# Patient Record
Sex: FEMALE | Race: WHITE | NOT HISPANIC OR LATINO | Employment: FULL TIME | URBAN - METROPOLITAN AREA
[De-identification: names, ages, dates, MRNs, and addresses within clinical notes are randomized per-mention and may not be internally consistent; named-entity substitution may affect disease eponyms.]

---

## 2019-06-17 ENCOUNTER — HISTORICAL (OUTPATIENT)
Dept: ADMINISTRATIVE | Facility: HOSPITAL | Age: 49
End: 2019-06-17

## 2019-06-17 LAB
ABS NEUT (OLG): 4.33 X10(3)/MCL (ref 2.1–9.2)
ALBUMIN SERPL-MCNC: 3.7 GM/DL (ref 3.4–5)
ALBUMIN/GLOB SERPL: 1.2 RATIO (ref 1.1–2)
ALP SERPL-CCNC: 50 UNIT/L (ref 38–126)
ALT SERPL-CCNC: 33 UNIT/L (ref 12–78)
AST SERPL-CCNC: 19 UNIT/L (ref 15–37)
BASOPHILS # BLD AUTO: 0 X10(3)/MCL (ref 0–0.2)
BASOPHILS NFR BLD AUTO: 0.4 %
BILIRUB SERPL-MCNC: 0.2 MG/DL (ref 0.2–1)
BILIRUBIN DIRECT+TOT PNL SERPL-MCNC: 0.1 MG/DL (ref 0–0.5)
BILIRUBIN DIRECT+TOT PNL SERPL-MCNC: 0.1 MG/DL (ref 0–0.8)
BUN SERPL-MCNC: 17 MG/DL (ref 7–18)
CALCIUM SERPL-MCNC: 8.9 MG/DL (ref 8.5–10.1)
CHLORIDE SERPL-SCNC: 106 MMOL/L (ref 98–107)
CO2 SERPL-SCNC: 32 MMOL/L (ref 21–32)
CREAT SERPL-MCNC: 0.75 MG/DL (ref 0.55–1.02)
DEPRECATED CALCIDIOL+CALCIFEROL SERPL-MC: 23.93 NG/ML (ref 30–80)
EOSINOPHIL # BLD AUTO: 0.2 X10(3)/MCL (ref 0–0.9)
EOSINOPHIL NFR BLD AUTO: 2.9 %
ERYTHROCYTE [DISTWIDTH] IN BLOOD BY AUTOMATED COUNT: 13.1 % (ref 11.5–17)
GLOBULIN SER-MCNC: 3.1 GM/DL (ref 2.4–3.5)
GLUCOSE SERPL-MCNC: 99 MG/DL (ref 74–106)
HCT VFR BLD AUTO: 40.2 % (ref 37–47)
HGB BLD-MCNC: 12.5 GM/DL (ref 12–16)
LYMPHOCYTES # BLD AUTO: 1.6 X10(3)/MCL (ref 0.6–4.6)
LYMPHOCYTES NFR BLD AUTO: 24.2 %
MCH RBC QN AUTO: 29.6 PG (ref 27–31)
MCHC RBC AUTO-ENTMCNC: 31.1 GM/DL (ref 33–36)
MCV RBC AUTO: 95 FL (ref 80–94)
MONOCYTES # BLD AUTO: 0.6 X10(3)/MCL (ref 0.1–1.3)
MONOCYTES NFR BLD AUTO: 8.8 %
NEUTROPHILS # BLD AUTO: 4.3 X10(3)/MCL (ref 2.1–9.2)
NEUTROPHILS NFR BLD AUTO: 63.6 %
PLATELET # BLD AUTO: 243 X10(3)/MCL (ref 130–400)
PMV BLD AUTO: 9.2 FL (ref 9.4–12.4)
POTASSIUM SERPL-SCNC: 3.7 MMOL/L (ref 3.5–5.1)
PROT SERPL-MCNC: 6.8 GM/DL (ref 6.4–8.2)
RBC # BLD AUTO: 4.23 X10(6)/MCL (ref 4.2–5.4)
SODIUM SERPL-SCNC: 143 MMOL/L (ref 136–145)
WBC # SPEC AUTO: 6.8 X10(3)/MCL (ref 4.5–11.5)

## 2019-06-24 PROBLEM — F43.23 ADJUSTMENT DISORDER WITH MIXED ANXIETY AND DEPRESSED MOOD: Status: ACTIVE | Noted: 2019-06-24

## 2019-06-24 PROBLEM — M19.90 OSTEOARTHRITIS: Status: ACTIVE | Noted: 2019-06-24

## 2019-06-24 PROBLEM — M19.90 OSTEOARTHRITIS: Chronic | Status: ACTIVE | Noted: 2019-06-24

## 2019-12-16 ENCOUNTER — HISTORICAL (OUTPATIENT)
Dept: ADMINISTRATIVE | Facility: HOSPITAL | Age: 49
End: 2019-12-16

## 2019-12-16 LAB
ABS NEUT (OLG): 5.61 X10(3)/MCL (ref 2.1–9.2)
ALBUMIN SERPL-MCNC: 4.1 GM/DL (ref 3.4–5)
ALBUMIN/GLOB SERPL: 1.2 RATIO (ref 1.1–2)
ALP SERPL-CCNC: 50 UNIT/L (ref 38–126)
ALT SERPL-CCNC: 42 UNIT/L (ref 12–78)
AST SERPL-CCNC: 23 UNIT/L (ref 15–37)
BASOPHILS # BLD AUTO: 0 X10(3)/MCL (ref 0–0.2)
BASOPHILS NFR BLD AUTO: 0.2 %
BILIRUB SERPL-MCNC: 0.3 MG/DL (ref 0.2–1)
BILIRUBIN DIRECT+TOT PNL SERPL-MCNC: 0.1 MG/DL (ref 0–0.5)
BILIRUBIN DIRECT+TOT PNL SERPL-MCNC: 0.2 MG/DL (ref 0–0.8)
BUN SERPL-MCNC: 11 MG/DL (ref 7–18)
CALCIUM SERPL-MCNC: 10 MG/DL (ref 8.5–10.1)
CHLORIDE SERPL-SCNC: 104 MMOL/L (ref 98–107)
CO2 SERPL-SCNC: 27 MMOL/L (ref 21–32)
CREAT SERPL-MCNC: 0.65 MG/DL (ref 0.55–1.02)
DEPRECATED CALCIDIOL+CALCIFEROL SERPL-MC: 55.27 NG/ML (ref 30–80)
EOSINOPHIL # BLD AUTO: 0.1 X10(3)/MCL (ref 0–0.9)
EOSINOPHIL NFR BLD AUTO: 1.3 %
ERYTHROCYTE [DISTWIDTH] IN BLOOD BY AUTOMATED COUNT: 13.1 % (ref 11.5–17)
GLOBULIN SER-MCNC: 3.3 GM/DL (ref 2.4–3.5)
GLUCOSE SERPL-MCNC: 95 MG/DL (ref 74–106)
HCT VFR BLD AUTO: 42.2 % (ref 37–47)
HGB BLD-MCNC: 13.6 GM/DL (ref 12–16)
LYMPHOCYTES # BLD AUTO: 1.9 X10(3)/MCL (ref 0.6–4.6)
LYMPHOCYTES NFR BLD AUTO: 23 %
MCH RBC QN AUTO: 29.8 PG (ref 27–31)
MCHC RBC AUTO-ENTMCNC: 32.2 GM/DL (ref 33–36)
MCV RBC AUTO: 92.5 FL (ref 80–94)
MONOCYTES # BLD AUTO: 0.5 X10(3)/MCL (ref 0.1–1.3)
MONOCYTES NFR BLD AUTO: 6.5 %
NEUTROPHILS # BLD AUTO: 5.6 X10(3)/MCL (ref 2.1–9.2)
NEUTROPHILS NFR BLD AUTO: 68.9 %
PLATELET # BLD AUTO: 252 X10(3)/MCL (ref 130–400)
PMV BLD AUTO: 9.1 FL (ref 9.4–12.4)
POTASSIUM SERPL-SCNC: 3.9 MMOL/L (ref 3.5–5.1)
PROT SERPL-MCNC: 7.4 GM/DL (ref 6.4–8.2)
RBC # BLD AUTO: 4.56 X10(6)/MCL (ref 4.2–5.4)
SODIUM SERPL-SCNC: 139 MMOL/L (ref 136–145)
WBC # SPEC AUTO: 8.2 X10(3)/MCL (ref 4.5–11.5)

## 2020-06-15 ENCOUNTER — HISTORICAL (OUTPATIENT)
Dept: ADMINISTRATIVE | Facility: HOSPITAL | Age: 50
End: 2020-06-15

## 2020-06-15 LAB
ABS NEUT (OLG): 4.01 X10(3)/MCL (ref 2.1–9.2)
ALBUMIN SERPL-MCNC: 4.2 GM/DL (ref 3.5–5)
ALBUMIN/GLOB SERPL: 1.3 RATIO (ref 1.1–2)
ALP SERPL-CCNC: 56 UNIT/L (ref 40–150)
ALT SERPL-CCNC: 23 UNIT/L (ref 0–55)
AST SERPL-CCNC: 20 UNIT/L (ref 5–34)
BASOPHILS # BLD AUTO: 0 X10(3)/MCL (ref 0–0.2)
BASOPHILS NFR BLD AUTO: 0.5 %
BILIRUB SERPL-MCNC: 0.3 MG/DL
BILIRUBIN DIRECT+TOT PNL SERPL-MCNC: 0.1 MG/DL (ref 0–0.5)
BILIRUBIN DIRECT+TOT PNL SERPL-MCNC: 0.2 MG/DL (ref 0–0.8)
BUN SERPL-MCNC: 11 MG/DL (ref 9.8–20.1)
CALCIUM SERPL-MCNC: 10.2 MG/DL (ref 8.4–10.2)
CHLORIDE SERPL-SCNC: 104 MMOL/L (ref 98–107)
CO2 SERPL-SCNC: 28 MMOL/L (ref 22–29)
CREAT SERPL-MCNC: 0.67 MG/DL (ref 0.55–1.02)
DEPRECATED CALCIDIOL+CALCIFEROL SERPL-MC: 54.1 NG/ML (ref 6.6–49.9)
EOSINOPHIL # BLD AUTO: 0.2 X10(3)/MCL (ref 0–0.9)
EOSINOPHIL NFR BLD AUTO: 3 %
ERYTHROCYTE [DISTWIDTH] IN BLOOD BY AUTOMATED COUNT: 13.1 % (ref 11.5–17)
GLOBULIN SER-MCNC: 3.3 GM/DL (ref 2.4–3.5)
GLUCOSE SERPL-MCNC: 99 MG/DL (ref 74–100)
HCT VFR BLD AUTO: 43.9 % (ref 37–47)
HGB BLD-MCNC: 13.8 GM/DL (ref 12–16)
LYMPHOCYTES # BLD AUTO: 1.8 X10(3)/MCL (ref 0.6–4.6)
LYMPHOCYTES NFR BLD AUTO: 28 %
MCH RBC QN AUTO: 29.6 PG (ref 27–31)
MCHC RBC AUTO-ENTMCNC: 31.4 GM/DL (ref 33–36)
MCV RBC AUTO: 94 FL (ref 80–94)
MONOCYTES # BLD AUTO: 0.5 X10(3)/MCL (ref 0.1–1.3)
MONOCYTES NFR BLD AUTO: 7.7 %
NEUTROPHILS # BLD AUTO: 4 X10(3)/MCL (ref 2.1–9.2)
NEUTROPHILS NFR BLD AUTO: 60.6 %
PLATELET # BLD AUTO: 247 X10(3)/MCL (ref 130–400)
PMV BLD AUTO: 8.7 FL (ref 9.4–12.4)
POTASSIUM SERPL-SCNC: 4.5 MMOL/L (ref 3.5–5.1)
PROT SERPL-MCNC: 7.5 GM/DL (ref 6.4–8.3)
RBC # BLD AUTO: 4.67 X10(6)/MCL (ref 4.2–5.4)
SODIUM SERPL-SCNC: 142 MMOL/L (ref 136–145)
WBC # SPEC AUTO: 6.6 X10(3)/MCL (ref 4.5–11.5)

## 2021-05-10 ENCOUNTER — HISTORICAL (OUTPATIENT)
Dept: ADMINISTRATIVE | Facility: HOSPITAL | Age: 51
End: 2021-05-10

## 2021-05-10 LAB
ABS NEUT (OLG): 5.62 X10(3)/MCL (ref 2.1–9.2)
ALBUMIN SERPL-MCNC: 4.7 GM/DL (ref 3.5–5)
ALBUMIN/GLOB SERPL: 1.3 RATIO (ref 1.1–2)
ALP SERPL-CCNC: 77 UNIT/L (ref 40–150)
ALT SERPL-CCNC: 38 UNIT/L (ref 0–55)
AST SERPL-CCNC: 26 UNIT/L (ref 5–34)
BASOPHILS # BLD AUTO: 0 X10(3)/MCL (ref 0–0.2)
BASOPHILS NFR BLD AUTO: 0.2 %
BILIRUB SERPL-MCNC: 0.6 MG/DL
BILIRUBIN DIRECT+TOT PNL SERPL-MCNC: 0.2 MG/DL (ref 0–0.5)
BILIRUBIN DIRECT+TOT PNL SERPL-MCNC: 0.4 MG/DL (ref 0–0.8)
BUN SERPL-MCNC: 12.5 MG/DL (ref 9.8–20.1)
CALCIUM SERPL-MCNC: 11.3 MG/DL (ref 8.4–10.2)
CHLORIDE SERPL-SCNC: 99 MMOL/L (ref 98–107)
CO2 SERPL-SCNC: 28 MMOL/L (ref 22–29)
CREAT SERPL-MCNC: 0.73 MG/DL (ref 0.55–1.02)
DEPRECATED CALCIDIOL+CALCIFEROL SERPL-MC: 38.9 NG/ML (ref 30–80)
EOSINOPHIL # BLD AUTO: 0.2 X10(3)/MCL (ref 0–0.9)
EOSINOPHIL NFR BLD AUTO: 2.2 %
ERYTHROCYTE [DISTWIDTH] IN BLOOD BY AUTOMATED COUNT: 14.8 % (ref 11.5–17)
GLOBULIN SER-MCNC: 3.7 GM/DL (ref 2.4–3.5)
GLUCOSE SERPL-MCNC: 86 MG/DL (ref 74–100)
HCT VFR BLD AUTO: 44.6 % (ref 37–47)
HGB BLD-MCNC: 14.4 GM/DL (ref 12–16)
LYMPHOCYTES # BLD AUTO: 2.1 X10(3)/MCL (ref 0.6–4.6)
LYMPHOCYTES NFR BLD AUTO: 24.3 %
MCH RBC QN AUTO: 29.7 PG (ref 27–31)
MCHC RBC AUTO-ENTMCNC: 32.3 GM/DL (ref 33–36)
MCV RBC AUTO: 92 FL (ref 80–94)
MONOCYTES # BLD AUTO: 0.7 X10(3)/MCL (ref 0.1–1.3)
MONOCYTES NFR BLD AUTO: 8.3 %
NEUTROPHILS # BLD AUTO: 5.6 X10(3)/MCL (ref 2.1–9.2)
NEUTROPHILS NFR BLD AUTO: 64.8 %
PLATELET # BLD AUTO: 239 X10(3)/MCL (ref 130–400)
PMV BLD AUTO: 8.9 FL (ref 9.4–12.4)
POTASSIUM SERPL-SCNC: 4 MMOL/L (ref 3.5–5.1)
PROT SERPL-MCNC: 8.4 GM/DL (ref 6.4–8.3)
RBC # BLD AUTO: 4.85 X10(6)/MCL (ref 4.2–5.4)
SODIUM SERPL-SCNC: 139 MMOL/L (ref 136–145)
WBC # SPEC AUTO: 8.7 X10(3)/MCL (ref 4.5–11.5)

## 2021-06-28 PROBLEM — N94.10 DYSPAREUNIA, FEMALE: Status: ACTIVE | Noted: 2021-06-28

## 2021-06-28 PROBLEM — Z85.3 HISTORY OF BREAST CANCER: Status: ACTIVE | Noted: 2021-06-28

## 2021-06-28 PROBLEM — N89.8 VAGINAL DRYNESS: Status: ACTIVE | Noted: 2021-06-28

## 2021-06-28 PROBLEM — Z01.419 WOMEN'S ANNUAL ROUTINE GYNECOLOGICAL EXAMINATION: Status: ACTIVE | Noted: 2021-06-28

## 2021-09-13 ENCOUNTER — HISTORICAL (OUTPATIENT)
Dept: ADMINISTRATIVE | Facility: HOSPITAL | Age: 51
End: 2021-09-13

## 2021-09-13 LAB
ABS NEUT (OLG): 5.16 X10(3)/MCL (ref 2.1–9.2)
ALBUMIN SERPL-MCNC: 4.2 GM/DL (ref 3.5–5)
ALBUMIN/GLOB SERPL: 1.5 RATIO (ref 1.1–2)
ALP SERPL-CCNC: 70 UNIT/L (ref 40–150)
ALT SERPL-CCNC: 25 UNIT/L (ref 0–55)
AST SERPL-CCNC: 17 UNIT/L (ref 5–34)
BASOPHILS # BLD AUTO: 0 X10(3)/MCL (ref 0–0.2)
BASOPHILS NFR BLD AUTO: 0.3 %
BILIRUB SERPL-MCNC: 0.3 MG/DL
BILIRUBIN DIRECT+TOT PNL SERPL-MCNC: 0.1 MG/DL (ref 0–0.5)
BILIRUBIN DIRECT+TOT PNL SERPL-MCNC: 0.2 MG/DL (ref 0–0.8)
BUN SERPL-MCNC: 9.4 MG/DL (ref 9.8–20.1)
CALCIUM SERPL-MCNC: 9.8 MG/DL (ref 8.4–10.2)
CHLORIDE SERPL-SCNC: 106 MMOL/L (ref 98–107)
CO2 SERPL-SCNC: 27 MMOL/L (ref 22–29)
CREAT SERPL-MCNC: 0.65 MG/DL (ref 0.55–1.02)
DEPRECATED CALCIDIOL+CALCIFEROL SERPL-MC: 39.5 NG/ML (ref 30–80)
EOSINOPHIL # BLD AUTO: 0.2 X10(3)/MCL (ref 0–0.9)
EOSINOPHIL NFR BLD AUTO: 3.4 %
ERYTHROCYTE [DISTWIDTH] IN BLOOD BY AUTOMATED COUNT: 14 % (ref 11.5–17)
GLOBULIN SER-MCNC: 2.8 GM/DL (ref 2.4–3.5)
GLUCOSE SERPL-MCNC: 99 MG/DL (ref 74–100)
HCT VFR BLD AUTO: 41.6 % (ref 37–47)
HGB BLD-MCNC: 13.4 GM/DL (ref 12–16)
LYMPHOCYTES # BLD AUTO: 1.4 X10(3)/MCL (ref 0.6–4.6)
LYMPHOCYTES NFR BLD AUTO: 18.8 %
MCH RBC QN AUTO: 30.1 PG (ref 27–31)
MCHC RBC AUTO-ENTMCNC: 32.2 GM/DL (ref 33–36)
MCV RBC AUTO: 93.5 FL (ref 80–94)
MONOCYTES # BLD AUTO: 0.6 X10(3)/MCL (ref 0.1–1.3)
MONOCYTES NFR BLD AUTO: 7.5 %
NEUTROPHILS # BLD AUTO: 5.2 X10(3)/MCL (ref 2.1–9.2)
NEUTROPHILS NFR BLD AUTO: 69.9 %
PLATELET # BLD AUTO: 228 X10(3)/MCL (ref 130–400)
PMV BLD AUTO: 9.3 FL (ref 9.4–12.4)
POTASSIUM SERPL-SCNC: 4 MMOL/L (ref 3.5–5.1)
PROT SERPL-MCNC: 7 GM/DL (ref 6.4–8.3)
RBC # BLD AUTO: 4.45 X10(6)/MCL (ref 4.2–5.4)
SODIUM SERPL-SCNC: 142 MMOL/L (ref 136–145)
VIT B12 SERPL-MCNC: 1037 PG/ML (ref 213–816)
WBC # SPEC AUTO: 7.4 X10(3)/MCL (ref 4.5–11.5)

## 2022-03-14 ENCOUNTER — HISTORICAL (OUTPATIENT)
Dept: ADMINISTRATIVE | Facility: HOSPITAL | Age: 52
End: 2022-03-14

## 2022-03-14 LAB
ABS NEUT (OLG): 2.76 (ref 2.1–9.2)
ALBUMIN SERPL-MCNC: 3.6 G/DL (ref 3.5–5)
ALBUMIN/GLOB SERPL: 1.3 {RATIO} (ref 1.1–2)
ALP SERPL-CCNC: 48 U/L (ref 40–150)
ALT SERPL-CCNC: 10 U/L (ref 0–55)
AST SERPL-CCNC: 14 U/L (ref 5–34)
BASOPHILS # BLD AUTO: 0 10*3/UL (ref 0–0.2)
BASOPHILS NFR BLD AUTO: 0.4 %
BILIRUB SERPL-MCNC: 0.1 MG/DL
BILIRUBIN DIRECT+TOT PNL SERPL-MCNC: <0.1 (ref 0–0.5)
BILIRUBIN DIRECT+TOT PNL SERPL-MCNC: >0 (ref 0–0.8)
BUN SERPL-MCNC: 14.7 MG/DL (ref 9.8–20.1)
CALCIUM SERPL-MCNC: 8.3 MG/DL (ref 8.7–10.5)
CHLORIDE SERPL-SCNC: 109 MMOL/L (ref 98–107)
CO2 SERPL-SCNC: 30 MMOL/L (ref 22–29)
CREAT SERPL-MCNC: 0.59 MG/DL (ref 0.55–1.02)
EOSINOPHIL # BLD AUTO: 0.1 10*3/UL (ref 0–0.9)
EOSINOPHIL NFR BLD AUTO: 2 %
ERYTHROCYTE [DISTWIDTH] IN BLOOD BY AUTOMATED COUNT: 13.8 % (ref 11.5–17)
GLOBULIN SER-MCNC: 2.7 G/DL (ref 2.4–3.5)
GLUCOSE SERPL-MCNC: 104 MG/DL (ref 74–100)
HCT VFR BLD AUTO: 39.2 % (ref 37–47)
HEMOLYSIS INTERF INDEX SERPL-ACNC: 6
HGB BLD-MCNC: 12.5 G/DL (ref 12–16)
ICTERIC INTERF INDEX SERPL-ACNC: 1
LIPEMIC INTERF INDEX SERPL-ACNC: 16
LYMPHOCYTES # BLD AUTO: 1.7 10*3/UL (ref 0.6–4.6)
LYMPHOCYTES NFR BLD AUTO: 34.6 %
MANUAL DIFF? (OHS): NO
MCH RBC QN AUTO: 29.9 PG (ref 27–31)
MCHC RBC AUTO-ENTMCNC: 31.9 G/DL (ref 33–36)
MCV RBC AUTO: 93.8 FL (ref 80–94)
MONOCYTES # BLD AUTO: 0.4 10*3/UL (ref 0.1–1.3)
MONOCYTES NFR BLD AUTO: 8 %
NEUTROPHILS # BLD AUTO: 2.8 10*3/UL (ref 2.1–9.2)
NEUTROPHILS NFR BLD AUTO: 54.8 %
PLATELET # BLD AUTO: 185 10*3/UL (ref 130–400)
PMV BLD AUTO: 9.1 FL (ref 9.4–12.4)
POTASSIUM SERPL-SCNC: 3.6 MMOL/L (ref 3.5–5.1)
PROT SERPL-MCNC: 6.3 G/DL (ref 6.4–8.3)
RBC # BLD AUTO: 4.18 10*6/UL (ref 4.2–5.4)
SODIUM SERPL-SCNC: 146 MMOL/L (ref 136–145)
WBC # SPEC AUTO: 5 10*3/UL (ref 4.5–11.5)

## 2022-05-03 NOTE — HISTORICAL [facility] CERNER
This is a historical note converted from Naren. Formatting and pictures may have been removed.  Please reference Naren for original formatting and attached multimedia. Chief Complaint  office visit-follow up  History of Present Illness  office visit-breast cancer  Breast cancer follow up  ?   Problem list:?Malignant neoplasm of central portion of right female breast, T2N0(i+)? M0, diagnosed on 12/9/2015.  ?????????????????? Surgical pathology: revealed a 2.5 cm?right breast invasive lobular carcinoma, surgical marking margins negative?all 3?lymph nodes negative for metastatic disease?however positive by???????????  ???????????????????immunohistochemical staining,  ?????????????????? ER 96% NC 81%, HER-2 new?negative not amplified by FISH, Ki-67 4%  ?????????????????? Oncotype score of 10  ?  ?   Current treatment:?Tamoxifen, started?March 2016.  ?   Treatment history:?  Bilateral simple mastectomies followed by Adjuvant Taxotere/Cytoxan?every 3 weeks x4, completed March 25, 2016. with?reconstruction.  Genetic screening?BRCA negative  ?????????????????????????  History of Present Illness  Patients ?history?starts?in 2015 where she had an abnormal mammogram?and a right breast mass.? Subsequently, biopsy confirmed?lobular carcinoma. ?She underwent bilateral simple mastectomies with a sentinel lymph node biopsy.? Genetic testing?and myriad risk was negative. She had an Oncotype diagnosis score of 10. Due to her strong family history and young age, she was given adjuvant chemotherapy with Taxotere and Cytoxan. She completed 4 cycles q 3 weeks in March 2016. She had reconstruction and was started on tamoxifen.  In 2017, she moved from [place] and obtained local oncologist in [place]. Dr. Cheng has been following her since then.  ?   12/10/18: Patient presents for routine surveillance today for breast cancer. She is taking tamoxifen.? Since her last visit. ?Patient was started on?Effexor for hot flashes. ?It is  made a significant difference.? She still complains of fatigue some weight gain night sweats and hot flashes. ?Chronic postnasal drip and hayfever. ?Chronic heartburn indigestion.? She still got her pain in her feet greater than 10 years. ?The rest of her review of systems is unremarkable [1]  ?  ?  Interim history:?Patient was last seen by us in December 2018.  She is had one fall.? She does not check her sugar much.? She denies any new masses or lesions?besides osteoarthritic type pain in the rest of her review of systems is fairly unremarkable.  Review of Systems  Constitutional: ?[No fever, fatigue or weight loss. ?No chills, no night sweats. ?No weakness] ?  Eyes:? [No recent vision changes,]?  ENT:? [+ congestion,? noear pain, or sore throat. no ringing in ears. No Dry mouth. no epistaxis .? No dental problems.? No epistaxis]?  Endocrine:? [No thyroid problems. ]?  Cardiovascular:? [No chest pain, Palpitations,? PND, orthopnea.? Edema]?  Respiratory:? [No cough, shortness of breath, congestion, or wheezing.? No hemoptysis]?  Gastrointestinal:? [No abdominal pain, nausea, vomiting, or diarrhea. No jaundice  NO Black tarry stool].? No heartburn.? No anorexia.? -mild reflux  Genitourinary: [No dysuria.? No frequency.]?  Musculoskeletal:? [No joint swelling.? No joint pain.]?  Neurologic:? [No seizures.? No numbness, or tingling. No Headaches. No loss of balance. no new weakness.? No headaches.? No tremors]  Skin:? [No rash.? No new skin lesions. no excess dryness]?  Hematologic:? [No unusual bruising or bleeding.]?  Psychiatric:? [No psychiatric problems, hallucinations or depression.]?  [All other systems reviewed and otherwise negative.]  ?  ?  ?  Physical Exam  Vitals & Measurements  T:?37.0? ?C (Oral)? HR:?83(Peripheral)? BP:?139/95?  HT:?167.6?cm? WT:?91.1?kg? BMI:?32.43?  VITAL SIGNS: ?Reviewed. ?[ ?]  GENERAL: [Well-developed well-nourished in no apparent cardiorespiratory distress]?  HEAD: Normocephalic,  atraumatic  EYES:? Pupils are equal.?? reactive to light.? Extraocular motions intact.? No scleral icterus.? No pallor.  EARS:? Hearing grossly intact.  MOUTH:? Oropharynx is clear without exudates or erythema.  NECK: Supple no adenopathy, no JVD.? Trachea midline  CHEST:? Chest with clear breath sounds bilaterally.? No wheezes, rales, or rhonchi.?  CARDIAC:? Regular rate and rhythm.? S1 and S2, without murmurs, gallops, or rubs.  VASCULAR:? No Edema.? Peripheral pulses normal and equal in all extremities.  ABDOMEN:? Soft, without detectable tenderness.? No sign of distention.? No?? rebound or guarding, and no masses palpated.?? Bowel Sounds normal.  MUSCULOSKELETAL:? Good range of motion of all major joints. Extremities without clubbing, cyanosis or edema.?  NEUROLOGIC EXAM:? Alert and oriented x 3.? No focal sensory or strength deficits.?? Speech normal.? Follows commands.  PSYCHIATRIC:? Mood normal.  SKIN:? No rash or lesions.  Breast: no masses , no nodules, no nipple discharge  Lymph nodes: no? supraclavicular, infraclavicular, axillary, or inguinal adenopathy  ECOG-0  Assessment/Plan  1.?Cancer of central portion of right female breast?C50.111  ?This is a 48-year-old female?who was diagnosed with Carcinoma of the right breast.? In December 2015. ?She had bilateral simple mastectomies followed by adjuvant chemotherapy of Taxotere Cytoxan every 3 weeks x 4 cycles?which she completed in March 2016. ?She then had bilateral reconstruction?she is currently on tamoxifen which she started in March 2016.  Ordered:  CBC with diff CLINIC, Routine collect, *Est. 12/17/19 3:00:00 CST, Blood, Order for future visit, *Est. Stop date 12/17/19 3:00:00 CST, Lab Collect, Cancer of central portion of right female breast, 06/17/19 14:33:00 CDT  Clinic Follow up, *Est. 12/17/19 3:00:00 CST, Order for future visit, Cancer of central portion of right female breast, [facility]  Comprehensive Metabolic Panel, Routine collect,  *Est. 12/17/19 3:00:00 CST, Blood, Order for future visit, *Est. Stop date 12/17/19 3:00:00 CST, Lab Collect, Cancer of central portion of right female breast, 06/17/19 14:33:00 CDT  Lab Draw, *Est. 12/17/19 3:00:00 CST, Order for future visit, Cancer of central portion of right female breast, [facility]  MG Dexa Bone Density Study, Routine, *Est. 07/29/19 4:00:00 CDT, Menopausal and Post Menopausal Disorder, None, Ambulatory, Rad Type, Order for future visit, Cancer of central portion of right female breast, Schedule this test, [facility], *Est. 07/29/19 4:00:00 CDT  Vitamin D, 25-Hydroxy Level, Routine collect, *Est. 12/17/19 3:00:00 CST, Blood, Order for future visit, *Est. Stop date 12/17/19 3:00:00 CST, Lab Collect, Cancer of central portion of right female breast, 06/17/19 14:33:00 CDT  ?  2.?Post-menopause?Z78.0  ?  3.?Vitamin D deficiency?E55.9  ?  Orders:  cholecalciferol, 50,000 IntUnit, Oral, qMonth, # 3 cap(s), 1 Refill(s), Pharmacy: [id number]  tamoxifen, 20 mg = 1 tab(s), Oral, Daily, # 90 tab(s), 1 Refill(s), Pharmacy: [id number]  Plan:  The overall plan was to continue tamoxifen until March 2021 and then switch to an aromatase inhibitor.? For 5 years.  We discussed the use of aromatase inhibitors and zoladex  or change to AI now  We will get a baseline bone density now that she is been menopausal for over 2 years.  Return to clinic in 6 months with? cbc/CMP, vitamin D panel-and based on her bone density results . will discuss change to AI on next visit  Bone density should be done every 2 years  Referrals  Clinic Follow up, *Est. 12/17/19 3:00:00 CST, Order for future visit, Cancer of central portion of right female breast, [facility]  Lab Draw, *Est. 12/17/19 3:00:00 CST, Order for future visit, Cancer of central portion of right female breast, [facility]   Problem List/Past Medical History  Ongoing  Chronic back pain  Hay fever  High  cholesterol  HTN - Hypertension  Obesity  Osteoarthritis  Diabetes  Postmenopausal  CPAP  Procedure/Surgical History  Bilateral reconstruction of breasts (07.2016)  Bilateral mastectomy (12.2015)  Colonoscopy (2011)  Esophagoduodenostomy (2011)  Partial hysterectomy (2011)  Extraction of wisdom tooth   Medications  cloNIDine, 0.2 mg, Oral, BID  Effexor XR 37.5 mg oral capsule, extended release, 37.5 mg= 1 cap(s), Oral, Daily  Flonase 50 mcg/inh nasal spray, 2 spray(s), Nasal, BID, PRN  metformin 500 mg oral tablet, 500 mg= 1 tab(s), Oral, BID  tamoxifen 20 mg oral tablet, 20 mg= 1 tab(s), Oral, Daily, 1 refills  traMADol 50 mg oral tablet, 50 mg= 1 tab(s), Oral, q4hr, PRN  Vitamin D3 50,000 intl units oral capsule, 71127 IntUnit, Oral, qMonth, 1 refills  Xyzal 5 mg oral tablet, 5 mg= 1 tab(s), Oral, qPM, PRN  Zanaflex 6 mg oral capsule, 6 mg= 1 cap(s), Oral, TID, PRN  Atrovent nasal spray  Allergies  No Known Allergies  Social History  Abuse/Neglect  No, 06/17/2019  Alcohol  Current, Beer, Wine, Liquor, 1-2 times per week, 12/10/2018  Substance Use  Never, 12/10/2018  Tobacco  Never (less than 100 in lifetime), N/A, 06/17/2019  Never smoker, N/A, 12/10/2018  Family History  Atrial fibrillation: Mother.  Breast cancer: Paternal Grandmother.  Congestive heart disease.: Mother and Father.  Diabetes mellitus: Mother and Brother.  Hyperlipidemia.: Mother.  Hypertension.: Mother and Sister.  Liver cancer: Uncle.  Primary malignant neoplasm of colon: Aunt.  Primary malignant neoplasm of prostate: Uncle.  Health Maintenance  Health Maintenance  ???Pending?(in the next year)  ??? ??OverDue  ??? ? ? ?Alcohol Misuse Screening due??01/01/19??and every 1??year(s)  ??? ??Due?  ??? ? ? ?ADL Screening due??06/17/19??and every 1??year(s)  ??? ? ? ?Diabetes Screening due??06/17/19??and every?  ??? ? ? ?Hypertension Management-BMP due??06/17/19??and every?  ??? ? ? ?Hypertension Management-Education due??06/17/19??and every  1??year(s)  ??? ? ? ?Tetanus Vaccine due??06/17/19??and every 10??year(s)  ??? ??Due In Future?  ??? ? ? ?Obesity Screening not due until??01/01/20??and every 1??year(s)  ??? ? ? ?Blood Pressure Screening not due until??06/16/20??and every 1??year(s)  ??? ? ? ?Body Mass Index Check not due until??06/16/20??and every 1??year(s)  ??? ? ? ?Depression Screening not due until??06/16/20??and every 1??year(s)  ??? ? ? ?Hypertension Management-Blood Pressure not due until??06/16/20??and every 1??year(s)  ???Satisfied?(in the past 1 year)  ??? ??Satisfied?  ??? ? ? ?Blood Pressure Screening on??06/17/19.??Satisfied by Viviana Haines LPN  ??? ? ? ?Body Mass Index Check on??06/17/19.??Satisfied by Viviana Haines LPN  ??? ? ? ?Depression Screening on??06/17/19.??Satisfied by Viviana Haines LPN  ??? ? ? ?Hypertension Management-Blood Pressure on??06/17/19.??Satisfied by Viviana Haines LPN  ??? ? ? ?Obesity Screening on??06/17/19.??Satisfied by Viviana Haines LPN  ?  ?  Lab Results  Test Name Test Result Date/Time   WBC 6.8 x10(3)/mcL 06/17/2019 13:33 CDT   RBC 4.23 x10(6)/mcL 06/17/2019 13:33 CDT   Hgb 12.5 gm/dL 06/17/2019 13:33 CDT   Hct 40.2 % 06/17/2019 13:33 CDT   Platelet 243 x10(3)/mcL 06/17/2019 13:33 CDT   MCV 95.0 fL (High) 06/17/2019 13:33 CDT   MCH 29.6 pg 06/17/2019 13:33 CDT   MCHC 31.1 gm/dL (Low) 06/17/2019 13:33 CDT   RDW 13.1 % 06/17/2019 13:33 CDT   MPV 9.2 fL (Low) 06/17/2019 13:33 CDT   Abs Neut 4.33 x10(3)/mcL 06/17/2019 13:33 CDT   NEUT% 63.6 % 06/17/2019 13:33 CDT   NEUT# 4.3 x10(3)/mcL 06/17/2019 13:33 CDT   LYMPH% 24.2 % 06/17/2019 13:33 CDT   LYMPH# 1.6 x10(3)/mcL 06/17/2019 13:33 CDT   MONO% 8.8 % 06/17/2019 13:33 CDT   MONO# 0.6 x10(3)/mcL 06/17/2019 13:33 CDT   EOS% 2.9 % 06/17/2019 13:33 CDT   EOS# 0.2 x10(3)/mcL 06/17/2019 13:33 CDT   BASO% 0.4 % 06/17/2019 13:33 CDT   BASO# 0.0 x10(3)/mcL 06/17/2019 13:33 CDT      [1]?Office Visit Note; Mann AGUERO, Myra Varner 12/07/2018 11:14 CST    His vitamin D still came back low at 23.9.? She is been compliant with her prescription.? However we will put her back on weekly dosing for 4 weeks and then back to once a month dosing.? She called she understood the above prescription sent

## 2022-05-03 NOTE — HISTORICAL [facility] CERNER
This is a historical note converted from Naren. Formatting and pictures may have been removed.  Please reference Naren for original formatting and attached multimedia. Chief Complaint  No concerns.  History of Present Illness  office visit-breast cancer  Breast cancer follow up  ?   Problem list:?Malignant neoplasm of central portion of right female breast, T2N0(i+)? M0, diagnosed on 12/9/2015.  ?????????????????? Surgical pathology: revealed a 2.5 cm?right breast invasive lobular carcinoma, surgical marking margins negative?all 3?lymph nodes negative for metastatic disease?however positive by???????????  ???????????????????immunohistochemical staining,  ?????????????????? ER 96% OR 81%, HER-2 new?negative not amplified by FISH, Ki-67 4%  ?????????????????? Oncotype score of 10  ?  ?   Current treatment:?Tamoxifen, started?March 2016.  ?   Treatment history:?  Bilateral simple mastectomies followed by Adjuvant Taxotere/Cytoxan?every 3 weeks x4, completed March 25, 2016. with?reconstruction.  Genetic screening?BRCA negative  ?????????????????????????  History of Present Illness  Patients ?history?starts?in 2015 where she had an abnormal mammogram?and a right breast mass.? Subsequently, biopsy confirmed?lobular carcinoma. ?She underwent bilateral simple mastectomies with a sentinel lymph node biopsy.? Genetic testing?and myriad risk was negative. She had an Oncotype diagnosis score of 10. Due to her strong family history and young age, she was given adjuvant chemotherapy with Taxotere and Cytoxan. She completed 4 cycles q 3 weeks in March 2016. She had reconstruction and was started on tamoxifen.  In 2017, she moved from [place]?to ?[place]. Dr. Cheng has been following her since then.  ?   12/10/18: Patient presents for routine surveillance today for breast cancer. She is taking tamoxifen.? Since her last visit. ?Patient was started on?Effexor for hot flashes. ?It is made a significant difference.? She  still complains of fatigue some weight gain night sweats and hot flashes. ?Chronic postnasal drip and hayfever. ?Chronic heartburn indigestion.? She still got her pain in her feet greater than 10 years. ?The rest of her review of systems is unremarkable  ?  ?   Interim history? :?Patient last seen December 2019.? The plan was for her to continue tamoxifen until March 2021 and then switch to an aromatase inhibitor.? She is to return to clinic?with labs?vitamin D.? And continue bone density every 2 years.  New onset bigemity--  Patient reports she was at work,?she noted some palpitations. ?New onset of aura.? She had bigeminy and some PACs.? Picked up. ?She has a scheduled follow-up with her cardiologist.? She is not noticed any since.  Review of Systems  12 point review of systems performed, pertinent positives above in interim history?otherwise negative  Physical Exam  Vitals & Measurements  T:?37? ?C (Oral)? HR:?86(Peripheral)? BP:?138/96?  HT:?167.6?cm? WT:?87.5?kg? BMI:?31.15?  VITAL SIGNS: ?Reviewed. ?[ ?]  GENERAL: [Well-developed well-nourished in no apparent cardiorespiratory distress]?  HEAD: Normocephalic, atraumatic  EYES:? Pupils are equal.?? reactive to light.? Extraocular motions intact.? No scleral icterus.? No pallor.  EARS:? Hearing grossly intact.  MOUTH:? Oropharynx is clear without exudates or erythema.  NECK: Supple no adenopathy, no JVD.? Trachea midline  CHEST:? Chest with clear breath sounds bilaterally.? No wheezes, rales, or rhonchi.?  CARDIAC:? Regular rate and rhythm.? S1 and S2, without murmurs, gallops, or rubs.  VASCULAR:? No Edema.? Peripheral pulses normal and equal in all extremities.  ABDOMEN:? Soft, without detectable tenderness.? No sign of distention.? No?? rebound or guarding, and no masses palpated.?? Bowel Sounds normal.  MUSCULOSKELETAL:? Good range of motion of all major joints. Extremities without clubbing, cyanosis or edema.?  NEUROLOGIC EXAM:? Alert and oriented x 3.? No  focal sensory or strength deficits.?? Speech normal.? Follows commands.  PSYCHIATRIC:? Mood normal.  SKIN:? No rash or lesions.  Breast: no masses , no nodules, no nipple discharge  Lymph nodes: no? supraclavicular, infraclavicular, axillary, or inguinal adenopathy  ECOG-0?  Assessment/Plan  1.?Post-menopause?Z78.0  2.?Cancer of central portion of right female breast?C50.111  3.?Vitamin D deficiency?E55.9  4. ?irRegular heart rhythm  The overall plan was to continue tamoxifen until March 2021 and then switch to an aromatase inhibitor.? For 5 years.  We discussed the use of aromatase inhibitors and Zoladex  or change to AI now  Return to clinic in 6 months with? cbc/CMP, vitamin D panel-and based on her bone density results . will discuss change to AI on next visit  Bone density should be done every 2 years  ?  ?  ?   will wait till next visit for change --until after cardiology evaluation  ?   Nir Cheng MD   Problem List/Past Medical History  Ongoing  Chronic back pain  Hay fever  High cholesterol  HTN - Hypertension  Obesity  Osteoarthritis  Post-menopause  Vitamin D deficiency  Procedure/Surgical History  Bilateral reconstruction of breasts (07.2016)  Bilateral mastectomy (12.2015)  Colonoscopy (2011)  Esophagoduodenostomy (2011)  Partial hysterectomy (2011)  Extraction of wisdom tooth   Medications  Atrovent 42 mcg/inh nasal spray, 2 spray(s)  cloNIDine, 0.2 mg, Oral, BID  Flonase 50 mcg/inh nasal spray, 2 spray(s), Nasal, BID, PRN  metformin 500 mg oral tablet, 500 mg= 1 tab(s), Oral, BID  metroNIDAZOLE 0.75% topical gel, 1 earl, TOP, BID  minocycline 100 mg oral capsule, 100 mg= 1 cap(s), Oral, q12hr  promethazine 25 mg oral tablet, 25 mg= 1 tab(s), Oral, q6hr  tamoxifen 20 mg oral tablet, 20 mg= 1 tab(s), Oral, Daily, 1 refills  temazepam 7.5 mg oral capsule, 7.5 mg= 1 cap(s), Oral, Once a day (at bedtime)  tiZANidine 4 mg oral tablet, 4 mg= 1 tab(s), Oral, BID  traMADol 50 mg oral tablet, 50 mg= 1  tab(s), Oral, q4hr, PRN  tretinoin 0.025% topical cream, 1 earl, TOP, qPM  valacyclovir 500 mg oral tablet, 500 mg= 1 tab(s), Oral, Daily  Vitamin D3 50,000 intl units oral capsule, See Instructions, 1 refills  Xyzal 5 mg oral tablet, 5 mg= 1 tab(s), Oral, qPM, PRN  Allergies  No Known Allergies  Social History  Abuse/Neglect  No, 06/15/2020  Alcohol  Current, Beer, Wine, Liquor, 1-2 times per week, 12/10/2018  Substance Use  Never, 12/10/2018  Tobacco  Never (less than 100 in lifetime), No, 06/15/2020  Never (less than 100 in lifetime), N/A, 06/17/2019  Never smoker, N/A, 12/10/2018  Family History  Atrial fibrillation: Mother.  Breast cancer: Paternal Grandmother.  Congestive heart disease.: Mother and Father.  Diabetes mellitus: Mother and Brother.  Hyperlipidemia.: Mother.  Hypertension.: Mother and Sister.  Liver cancer: Uncle.  Primary malignant neoplasm of colon: Aunt.  Primary malignant neoplasm of prostate: Uncle.  Health Maintenance  Health Maintenance  ???Pending?(in the next year)  ??? ??OverDue  ??? ? ? ?Alcohol Misuse Screening due??01/01/20??and every 1??year(s)  ??? ??Due?  ??? ? ? ?ADL Screening due??06/15/20??and every 1??year(s)  ??? ? ? ?Hypertension Management-Education due??06/15/20??and every 1??year(s)  ??? ? ? ?Tetanus Vaccine due??06/15/20??and every 10??year(s)  ??? ??Due In Future?  ??? ? ? ?Hypertension Management-BMP not due until??12/15/20??and every 1??year(s)  ??? ? ? ?Obesity Screening not due until??01/01/21??and every 1??year(s)  ???Satisfied?(in the past 1 year)  ??? ??Satisfied?  ??? ? ? ?Blood Pressure Screening on??06/15/20.??Satisfied by Ayala Cadena  ??? ? ? ?Body Mass Index Check on??06/15/20.??Satisfied by Ayala Cadena  ??? ? ? ?Depression Screening on??06/15/20.??Satisfied by Ayala Cadena  ??? ? ? ?Diabetes Screening on??12/16/19.??Satisfied by [place], Stephanie A.  ??? ? ? ?Hypertension Management-Blood Pressure on??06/15/20.??Satisfied by Ayala Cadena  ??? ? ?  ?Obesity Screening on??06/15/20.??Satisfied by Ayala Cadena  ?  Lab Results  Test Name Test Result Date/Time   WBC 6.6 x10(3)/[facility] 06/15/2020 12:39 CDT   RBC 4.67 x10(6)/[facility] 06/15/2020 12:39 CDT   Hgb 13.8 gm/dL 06/15/2020 12:39 CDT   Hct 43.9 % 06/15/2020 12:39 CDT   Platelet 247 x10(3)/[facility] 06/15/2020 12:39 CDT   MCV 94.0 fL 06/15/2020 12:39 CDT   MCH 29.6 pg 06/15/2020 12:39 CDT   MCHC 31.4 gm/dL (Low) 06/15/2020 12:39 CDT   RDW 13.1 % 06/15/2020 12:39 CDT   MPV 8.7 fL (Low) 06/15/2020 12:39 CDT   Abs Neut 4.01 x10(3)/[facility] 06/15/2020 12:39 CDT   NEUT% 60.6 % 06/15/2020 12:39 CDT   NEUT# 4.0 x10(3)/[facility] 06/15/2020 12:39 CDT   LYMPH% 28.0 % 06/15/2020 12:39 CDT   LYMPH# 1.8 x10(3)/[facility] 06/15/2020 12:39 CDT   MONO% 7.7 % 06/15/2020 12:39 CDT   MONO# 0.5 x10(3)/[facility] 06/15/2020 12:39 CDT   EOS% 3.0 % 06/15/2020 12:39 CDT   EOS# 0.2 x10(3)/[facility] 06/15/2020 12:39 CDT   BASO% 0.5 % 06/15/2020 12:39 CDT   BASO# 0.0 x10(3)/[facility] 06/15/2020 12:39 CDT

## 2022-05-03 NOTE — HISTORICAL [facility] CERNER
This is a historical note converted from Naren. Formatting and pictures may have been removed.  Please reference Naren for original formatting and attached multimedia. Chief Complaint  questions about medication  History of Present Illness  office visit-breast cancer  Breast cancer follow up  ?   Problem list:?Malignant neoplasm of central portion of right female breast, T2N0(i+)? M0, diagnosed on 12/9/2015.  ?????????????????? Surgical pathology: revealed a 2.5 cm?right breast invasive lobular carcinoma, surgical marking margins negative?all 3?lymph nodes negative for metastatic disease?however positive by???????????  ???????????????????immunohistochemical staining,  ?????????????????? ER 96% SD 81%, HER-2 new?negative not amplified by FISH, Ki-67 4%  ?????????????????? Oncotype score of 10  ?  ?   Current treatment:?Femara March 2021  ??????????????????????????????????????? Aromasin  Treatment history:?  Bilateral simple mastectomies  Adjuvant Taxotere/Cytoxan?every 3 weeks x4, completed March 25, 2016.  reconstruction.  Genetic screening?BRCA negative  ?   Tamoxifen?March 2016,?- March 2021  ?  ?   History of Present Illness  Patients ?history?starts?in 2015 where she had an abnormal mammogram?and a right breast mass.? Subsequently, biopsy confirmed?lobular carcinoma. ?She underwent bilateral simple mastectomies with a sentinel lymph node biopsy.? Genetic testing?and myriad risk was negative. She had an Oncotype diagnosis score of 10. Due to her strong family history and young age, she was given adjuvant chemotherapy with Taxotere and Cytoxan. She completed 4 cycles q 3 weeks in March 2016. She had reconstruction and was started on tamoxifen.  In 2017, she moved from [place]?to ?[place]. Dr. Cheng has been following her since then.  ?   Interim history:  9/13/2021--called about vaginal estrogen with atrophic vagitis. ?The patients biggest problem has been her joint pain and tendinitis after coming off  the tamoxifen to the aromatase inhibitors.? She did not tolerate the Femara well. ?The Aromasin is better?but?she still has atrophic vaginitis and difficulty with her?tendinitis and?both joint pain. ?Where she needs to take other medications.? While she is off of it she is better.  Review of Systems  Positives above in interim history, otherwise 12 point review systems negative  Physical Exam  Vitals & Measurements  T:?36.7? ?C (Oral)? HR:?77(Peripheral)? BP:?157/92?  HT:?167.60?cm? WT:?85.900?kg? BMI:?30.58?  VITAL SIGNS: ?Reviewed. ?[ ?]  GENERAL: [Well-developed well-nourished in no apparent cardiorespiratory distress]?  HEAD: Normocephalic, atraumatic  EYES:? Pupils are equal.?? reactive to light.? Extraocular motions intact.? No scleral icterus.? No pallor.  EARS:? Hearing grossly intact.  MOUTH:? Oropharynx is clear without exudates or erythema.  NECK: Supple no adenopathy, no JVD.? Trachea midline  CHEST:? Chest with clear breath sounds bilaterally.? No wheezes, rales, or rhonchi.?  CARDIAC:? Regular rate and rhythm.? S1 and S2, without murmurs, gallops, or rubs.  VASCULAR:? No Edema.? Peripheral pulses normal and equal in all extremities.  ABDOMEN:? Soft, without detectable tenderness.? No sign of distention.? No?? rebound or guarding, and no masses palpated.?? Bowel Sounds normal.  MUSCULOSKELETAL:? Good range of motion of all major joints. Extremities without clubbing, cyanosis or edema.?  NEUROLOGIC EXAM:? Alert and oriented x 3.? No focal sensory or strength deficits.?? Speech normal.? Follows commands.  PSYCHIATRIC:? Mood normal.  SKIN:? No rash or lesions.  Breast: mastectomy reconsstruction  Lymph nodes: no? supraclavicular, infraclavicular, axillary, or inguinal adenopathy  ECOG-0? [1] [1]  Assessment/Plan  1.?Vitamin D deficiency?E55.9  Ordered:  Comprehensive Metabolic Panel, ROUTINE collect, 09/13/21 14:39:32 CDT, BLOOD, Collected, Stop date 09/13/21 14:39:00 CDT, Lab Collect  Vitamin B12 Level,  ROUTINE collect, 09/13/21 14:39:32 CDT, BLOOD, Collected, Stop date 09/13/21 14:39:00 CDT, Lab Collect  Vitamin D, 25-Hydroxy Level, ROUTINE collect, 09/13/21 14:39:32 CDT, BLOOD, Collected, Stop date 09/13/21 14:34:00 CDT, Lab Collect  ?  2.?Aromatase inhibitor use?Z79.811  Ordered:  Comprehensive Metabolic Panel, ROUTINE collect, 09/13/21 14:39:32 CDT, BLOOD, Collected, Stop date 09/13/21 14:39:00 CDT, Lab Collect  Vitamin B12 Level, ROUTINE collect, 09/13/21 14:39:32 CDT, BLOOD, Collected, Stop date 09/13/21 14:39:00 CDT, Lab Collect  Vitamin D, 25-Hydroxy Level, ROUTINE collect, 09/13/21 14:39:32 CDT, BLOOD, Collected, Stop date 09/13/21 14:34:00 CDT, Lab Collect  ?  3.?Cancer of central portion of right female breast?C50.111  Ordered:  anastrozole, 1 mg = 1 tab(s), Oral, Daily, # 30 tab(s), 3 Refill(s), Pharmacy: [id number], 167.6, cm, Height/Length Dosing, 09/13/21 14:55:00 CDT, 85.9, kg, Weight Dosing, 09/13/21 14:55:00 CDT  CBC with diff CLINIC, Routine collect, *Est. 03/13/22 3:00:00 CDT, Blood, Order for future visit, *Est. Stop date 03/13/22 3:00:00 CDT, Lab Collect, Cancer of central portion of right female breast, 09/13/21 15:35:00 CDT  Clinic Follow up, *Est. 03/13/22 3:00:00 CDT, Order for future visit, Cancer of central portion of right female breast, [facility]  Comprehensive Metabolic Panel, ROUTINE collect, 09/13/21 14:39:32 CDT, BLOOD, Collected, Stop date 09/13/21 14:39:00 CDT, Lab Collect  Comprehensive Metabolic Panel, Routine collect, *Est. 03/13/22 3:00:00 CDT, Blood, Order for future visit, *Est. Stop date 03/13/22 3:00:00 CDT, Lab Collect, Cancer of central portion of right female breast, 09/13/21 15:35:00 CDT  Lab Draw, *Est. 03/13/22 3:00:00 CDT, Order for future visit, Cancer of central portion of right female breast, [facility]  Vitamin B12 Level, ROUTINE collect, 09/13/21 14:39:32 CDT, BLOOD, Collected, Stop date 09/13/21 14:39:00 CDT, Lab Collect  Vitamin D,  25-Hydroxy Level, ROUTINE collect, 09/13/21 14:39:32 CDT, BLOOD, Collected, Stop date 09/13/21 14:34:00 CDT, Lab Collect  ?  Return to clinic in 6 months with CBC, CMP,  Options of therapy discussed,: Changed to Arimidex,?changed back to tamoxifen  This a 50-year-old postmenopausal female now with an Oncotype of 10. ?She was treated years ago with Taxotere Cytoxan?and then tamoxifen x5 years. ?She was recently changed aromatase inhibitor in March 2021.  She is been intolerant to the above. ?We discussed?extended therapy?given the RAMSEY 17 data and other data. ?We discussed the side effects of aromatase inhibitors versus tamoxifen.? .  ?  Patient I elected to:  change to Arimidex  will call if if problems? and will go back to tamoxifen if issues  and  Referrals  Clinic Follow up, *Est. 03/13/22 3:00:00 CDT, Order for future visit, Cancer of central portion of right female breast, [facility]  Lab Draw, *Est. 03/13/22 3:00:00 CDT, Order for future visit, Cancer of central portion of right female breast, [facility]   Problem List/Past Medical History  Ongoing  Aromatase inhibitor use  Chronic back pain  Hay fever  High cholesterol  HTN - Hypertension  Obesity  Osteoarthritis  Post-menopause  Vitamin D deficiency  Procedure/Surgical History  Bilateral reconstruction of breasts (07.2016)  Bilateral mastectomy (12.2015)  Colonoscopy (2011)  Esophagoduodenostomy (2011)  Partial hysterectomy (2011)  Extraction of wisdom tooth   Medications  Arimidex 1 mg oral tablet, 1 mg= 1 tab(s), Oral, Daily, 3 refills  Aromasin 25 mg oral tablet, 25 mg= 1 tab(s), Oral, Daily, 1 refills  Atrovent 42 mcg/inh nasal spray, 2 spray(s)  azelastine 137 mcg/inh (0.1%) nasal spray, 2 spray(s), Both Nostrils, BID  cloniDINE 0.2 mg oral tablet, 0.2 mg= 1 tab(s), Oral, BID  conjugated estrogens 0.625 mg/g vaginal cream with applicator, 1 gm, VAG, qPM  diclofenac sodium 50 mg oral delayed release tablet, 50 mg= 1 tab(s), Oral,  BID  Flonase 50 mcg/inh nasal spray, 2 spray(s), Nasal, BID, PRN  ipratropium 21 mcg/inh (0.03%) nasal spray, 2 spray(s), Nasal, TID  metformin 500 mg oral tablet, 500 mg= 1 tab(s), Oral, BID  metoprolol succinate 25 mg oral tablet, extended release, 25 mg= 1 tab(s), Oral, Daily  metroNIDAZOLE 0.75% topical gel, 1 earl, TOP, BID  promethazine 25 mg oral tablet, 25 mg= 1 tab(s), Oral, q6hr  temazepam 7.5 mg oral capsule, 7.5 mg= 1 cap(s), Oral, Once a day (at bedtime)  tiZANidine 4 mg oral tablet, 4 mg= 1 tab(s), Oral, BID  traMADol 50 mg oral tablet, 50 mg= 1 tab(s), Oral, q4hr, PRN  tretinoin 0.025% topical cream, 1 earl, TOP, qPM  valacyclovir 500 mg oral tablet, 500 mg= 1 tab(s), Oral, Daily  Vitamin D3 50,000 intl units (1250 mcg) oral capsule, See Instructions, 1 refills  Xyzal 5 mg oral tablet, 5 mg= 1 tab(s), Oral, qPM, PRN  Allergies  No Known Allergies  Social History  Abuse/Neglect  No, 05/10/2021  Alcohol  Current, 1-2 times per week, 05/10/2021  Substance Use  Never, 05/10/2021  Tobacco  Never (less than 100 in lifetime), N/A, 05/10/2021  Family History  Atrial fibrillation: Mother.  Breast cancer: Paternal Grandmother.  Congestive heart disease.: Mother and Father.  Diabetes mellitus: Mother and Brother.  Hyperlipidemia.: Mother.  Hypertension.: Mother and Sister.  Liver cancer: Uncle.  Primary malignant neoplasm of colon: Aunt.  Primary malignant neoplasm of prostate: Uncle.  Health Maintenance  Health Maintenance  ???Pending?(in the next year)  ??? ??OverDue  ??? ? ? ?Alcohol Misuse Screening due??01/02/21??and every 1??year(s)  ??? ??Due?  ??? ? ? ?ADL Screening due??09/13/21??and every 1??year(s)  ??? ? ? ?Hypertension Management-Education due??09/13/21??and every 1??year(s)  ??? ? ? ?Tetanus Vaccine due??09/13/21??and every 10??year(s)  ??? ? ? ?Zoster Vaccine due??09/13/21??Unknown Frequency  ??? ??Due In Future?  ??? ? ? ?Colorectal Screening not due until??10/29/21??and every 10??year(s)  ??? ? ?  ?Obesity Screening not due until??01/01/22??and every 1??year(s)  ??? ? ? ?Hypertension Management-BMP not due until??05/10/22??and every 1??year(s)  ???Satisfied?(in the past 1 year)  ??? ??Satisfied?  ??? ? ? ?Blood Pressure Screening on??09/13/21.??Satisfied by Radha Vega  ??? ? ? ?Body Mass Index Check on??09/13/21.??Satisfied by Radha Vega  ??? ? ? ?Depression Screening on??09/13/21.??Satisfied by Radha Vega  ??? ? ? ?Diabetes Screening on??05/10/21.??Satisfied by Melanie De La Cruz  ??? ? ? ?Hypertension Management-Blood Pressure on??09/13/21.??Satisfied by Radha Vega  ??? ? ? ?Obesity Screening on??09/13/21.??Satisfied by Radha Vega  ?     [1]?Office Visit Note; Nir Cheng MD 05/10/2021 13:19 CDT   ?  ?  Patient wishes to change back to tamoxifen until next visit with you in March 2022.

## 2022-05-03 NOTE — HISTORICAL [facility] CERNER
This is a historical note converted from Naren. Formatting and pictures may have been removed.  Please reference Naren for original formatting and attached multimedia. Chief Complaint  pt states that she has been having tensitis in her joints lately that she dont usually have that is the pain in her elbows  History of Present Illness  office visit-breast cancer  Breast cancer follow up  ?   Problem list:?Malignant neoplasm of central portion of right female breast, T2N0(i+)? M0, diagnosed on 12/9/2015.  ?????????????????? Surgical pathology: revealed a 2.5 cm?right breast invasive lobular carcinoma, surgical marking margins negative?all 3?lymph nodes negative for metastatic disease?however positive by???????????  ???????????????????immunohistochemical staining,  ?????????????????? ER 96% [place] 81%, HER-2 new?negative not amplified by FISH, Ki-67 4%  ?????????????????? Oncotype score of 10  ?  ?   Current treatment:?Femara March 2021  Treatment history:?  Bilateral simple mastectomies  Adjuvant Taxotere/Cytoxan?every 3 weeks x4, completed March 25, 2016.  reconstruction.  Genetic screening?BRCA negative  ?   Tamoxifen?March 2016,?- March 2021  ?  ?   History of Present Illness  Patients ?history?starts?in 2015 where she had an abnormal mammogram?and a right breast mass.? Subsequently, biopsy confirmed?lobular carcinoma. ?She underwent bilateral simple mastectomies with a sentinel lymph node biopsy.? Genetic testing?and myriad risk was negative. She had an Oncotype diagnosis score of 10. Due to her strong family history and young age, she was given adjuvant chemotherapy with Taxotere and Cytoxan. She completed 4 cycles q 3 weeks in March 2016. She had reconstruction and was started on tamoxifen.  In 2017, she moved from [place]?to [facility]. Dr. Cheng has been following her since then.  ?   Interim history:  5/10/2021: Patient was last seen several months ago. ?She was started on Femara as a new medication  She  has been out of her Femara for about a week or so.? Her joint pains actually were tolerable, however she then developed some more severe tendinitis type issue where she had a some steroids and some injections.? This is now resolved  Review of Systems  Positives are above in interim history, otherwise 12 point review systems negative  Physical Exam  Vitals & Measurements  T:?37.0? ?C (Oral)? HR:?82(Peripheral)? RR:?18? BP:?134/99? SpO2:?98%?  HT:?167.60?cm? WT:?85.900?kg? BMI:?30.58?  VITAL SIGNS: ?Reviewed. ?[ ?]  GENERAL: [Well-developed well-nourished in no apparent cardiorespiratory distress]?  HEAD: Normocephalic, atraumatic  EYES:? Pupils are equal.?? reactive to light.? Extraocular motions intact.? No scleral icterus.? No pallor.  EARS:? Hearing grossly intact.  MOUTH:? Oropharynx is clear without exudates or erythema.  NECK: Supple no adenopathy, no JVD.? Trachea midline  CHEST:? Chest with clear breath sounds bilaterally.? No wheezes, rales, or rhonchi.?  CARDIAC:? Regular rate and rhythm.? S1 and S2, without murmurs, gallops, or rubs.  VASCULAR:? No Edema.? Peripheral pulses normal and equal in all extremities.  ABDOMEN:? Soft, without detectable tenderness.? No sign of distention.? No?? rebound or guarding, and no masses palpated.?? Bowel Sounds normal.  MUSCULOSKELETAL:? Good range of motion of all major joints. Extremities without clubbing, cyanosis or edema.?  NEUROLOGIC EXAM:? Alert and oriented x 3.? No focal sensory or strength deficits.?? Speech normal.? Follows commands.  PSYCHIATRIC:? Mood normal.  SKIN:? No rash or lesions.  Breast: mastectomy reconsstruction  Lymph nodes: no? supraclavicular, infraclavicular, axillary, or inguinal adenopathy  ECOG-0? [1]  Assessment/Plan  1.?Cancer of central portion of right female breast?C50.111  2.?Aromatase inhibitor use?Z79.811  3.?Post-menopause?Z78.0  4.?Vitamin D deficiency?E55.9  Complete remission  Cancer survivorship discussed  Side effects of  aromatase inhibitors discussed  Importance of bone health  ?  ?  Return to clinic in 6 months with CBC, CMP, vitamin D panel,, vitamin B12  Bone density every 2 years  Continue Femara until March 2026  if intolerable for  ?change to Aromasin  ?  Referrals  Clinic Follow up, *Est. 11/10/21 3:00:00 CST, Order for future visit, Cancer of central portion of right female breast  Aromatase inhibitor use  Post-menopause  Vitamin D deficiency, [facility]  Lab Draw, *Est. 11/10/21 3:00:00 CST, Order for future visit, Cancer of central portion of right female breast  Aromatase inhibitor use  Post-menopause  Vitamin D deficiency, [facility]   Problem List/Past Medical History  Ongoing  Chronic back pain  Hay fever  High cholesterol  HTN - Hypertension  Obesity  Osteoarthritis  Post-menopause  Vitamin D deficiency  Procedure/Surgical History  Bilateral reconstruction of breasts (07.2016)  Bilateral mastectomy (12.2015)  Colonoscopy (2011)  Esophagoduodenostomy (2011)  Partial hysterectomy (2011)  Extraction of wisdom tooth   Medications  Atrovent 42 mcg/inh nasal spray, 2 spray(s)  cloNIDine, 0.2 mg, Oral, BID  Femara 2.5 mg oral tablet, 2.5 mg= 1 tab(s), Oral, Daily, 1 refills  Flonase 50 mcg/inh nasal spray, 2 spray(s), Nasal, BID, PRN  metformin 500 mg oral tablet, 500 mg= 1 tab(s), Oral, BID  metoprolol succinate 25 mg oral tablet, extended release, 25 mg= 1 tab(s), Oral, Daily  metroNIDAZOLE 0.75% topical gel, 1 earl, TOP, BID  minocycline 100 mg oral capsule, 100 mg= 1 cap(s), Oral, q12hr  promethazine 25 mg oral tablet, 25 mg= 1 tab(s), Oral, q6hr  temazepam 7.5 mg oral capsule, 7.5 mg= 1 cap(s), Oral, Once a day (at bedtime)  tiZANidine 4 mg oral tablet, 4 mg= 1 tab(s), Oral, BID  traMADol 50 mg oral tablet, 50 mg= 1 tab(s), Oral, q4hr, PRN  tretinoin 0.025% topical cream, 1 earl, TOP, qPM  valacyclovir 500 mg oral tablet, 500 mg= 1 tab(s), Oral, Daily  Vitamin D3 50,000 intl units (1250 mcg) oral  capsule, See Instructions, 1 refills  Xyzal 5 mg oral tablet, 5 mg= 1 tab(s), Oral, qPM, PRN  Allergies  No Known Allergies  Social History  Abuse/Neglect  No, 05/10/2021  Alcohol  Current, 1-2 times per week, 05/10/2021  Substance Use  Never, 05/10/2021  Tobacco  Never (less than 100 in lifetime), N/A, 05/10/2021  Family History  Atrial fibrillation: Mother.  Breast cancer: Paternal Grandmother.  Congestive heart disease.: Mother and Father.  Diabetes mellitus: Mother and Brother.  Hyperlipidemia.: Mother.  Hypertension.: Mother and Sister.  Liver cancer: Uncle.  Primary malignant neoplasm of colon: Aunt.  Primary malignant neoplasm of prostate: Uncle.  Health Maintenance  Health Maintenance  ???Pending?(in the next year)  ??? ??OverDue  ??? ? ? ?Influenza Vaccine due??10/01/20??and every 1??day(s)  ??? ? ? ?Alcohol Misuse Screening due??01/02/21??and every 1??year(s)  ??? ??Due?  ??? ? ? ?ADL Screening due??05/10/21??and every 1??year(s)  ??? ? ? ?Hypertension Management-Education due??05/10/21??and every 1??year(s)  ??? ? ? ?Tetanus Vaccine due??05/10/21??and every 10??year(s)  ??? ? ? ?Zoster Vaccine due??05/10/21??Unknown Frequency  ??? ??Due In Future?  ??? ? ? ?Hypertension Management-BMP not due until??06/15/21??and every 1??year(s)  ??? ? ? ?Colorectal Screening not due until??10/29/21??and every 10??year(s)  ??? ? ? ?Obesity Screening not due until??01/01/22??and every 1??year(s)  ???Satisfied?(in the past 1 year)  ??? ??Satisfied?  ??? ? ? ?Blood Pressure Screening on??05/10/21.??Satisfied by Nelson Wood  ??? ? ? ?Body Mass Index Check on??05/10/21.??Satisfied by Nelson Wood  ??? ? ? ?Depression Screening on??05/10/21.??Satisfied by Nelson Wood  ??? ? ? ?Diabetes Screening on??06/15/20.??Satisfied by [place], Stephanie A.  ??? ? ? ?Hypertension Management-Blood Pressure on??05/10/21.??Satisfied by Nelson Wood  ??? ? ? ?Obesity Screening on??05/10/21.??Satisfied by Israel  Nelson  ?  Lab Results  BC stable, rest of labs pending

## 2022-05-03 NOTE — HISTORICAL [facility] CERNER
This is a historical note converted from Naren. Formatting and pictures may have been removed.  Please reference Naren for original formatting and attached multimedia. Chief Complaint  No c.o  History of Present Illness  office visit-breast cancer  Breast cancer follow up  ?   Problem list:?Malignant neoplasm of central portion of right female breast, T2N0(i+)? M0, diagnosed on 12/9/2015.  ?????????????????? Surgical pathology: revealed a 2.5 cm?right breast invasive lobular carcinoma, surgical marking margins negative?all 3?lymph nodes negative for metastatic disease?however positive by???????????  ???????????????????immunohistochemical staining,  ?????????????????? ER 96% NE 81%, HER-2 new?negative not amplified by FISH, Ki-67 4%  ?????????????????? Oncotype score of 10  ?  ?   Current treatment:?Tamoxifen, started?March 2016.  ?   Treatment history:?  Bilateral simple mastectomies followed by Adjuvant Taxotere/Cytoxan?every 3 weeks x4, completed March 25, 2016. with?reconstruction.  Genetic screening?BRCA negative  ?????????????????????????  History of Present Illness  Patients ?history?starts?in 2015 where she had an abnormal mammogram?and a right breast mass.? Subsequently, biopsy confirmed?lobular carcinoma. ?She underwent bilateral simple mastectomies with a sentinel lymph node biopsy.? Genetic testing?and myriad risk was negative. She had an Oncotype diagnosis score of 10. Due to her strong family history and young age, she was given adjuvant chemotherapy with Taxotere and Cytoxan. She completed 4 cycles q 3 weeks in March 2016. She had reconstruction and was started on tamoxifen.  In 2017, she moved from [place]?to ?[place]. Dr. Cheng has been following her since then.  ?   12/10/18: Patient presents for routine surveillance today for breast cancer. She is taking tamoxifen.? Since her last visit. ?Patient was started on?Effexor for hot flashes. ?It is made a significant difference.? She still  complains of fatigue some weight gain night sweats and hot flashes. ?Chronic postnasal drip and hayfever. ?Chronic heartburn indigestion.? She still got her pain in her feet greater than 10 years. ?The rest of her review of systems is unremarkable [1]  ?  ?   Interim history: She had a nuclear medicine bone density on?7/2019 which was normal.  Last seen in clinic in June.? She denies any headache. ?She has age-related visual changes. ?She needs to adjust her glasses. ?No headaches or dizzy spells. ?She still has hot flashes.? She has no change in cough shortness of breath.? She still has joint aches. ?She has been taking her vitamin D.? She is had no return in cycles.  Review of Systems  Pertinent positives above in interim history, otherwise 12 point review of systems negative  Physical Exam  Vitals & Measurements  T:?37.3? ?C (Oral)? HR:?90(Peripheral)? BP:?129/90?  HT:?167.6?cm? WT:?87.9?kg? BMI:?31.29?  VITAL SIGNS: ?Reviewed. ?[ ?]  GENERAL: [Well-developed well-nourished in no apparent cardiorespiratory distress]?  HEAD: Normocephalic, atraumatic  EYES:? Pupils are equal.?? reactive to light.? Extraocular motions intact.? No scleral icterus.? No pallor.  EARS:? Hearing grossly intact.  MOUTH:? Oropharynx is clear without exudates or erythema.  NECK: Supple no adenopathy, no JVD.? Trachea midline  CHEST:? Chest with clear breath sounds bilaterally.? No wheezes, rales, or rhonchi.?  CARDIAC:? Regular rate and rhythm.? S1 and S2, without murmurs, gallops, or rubs.  VASCULAR:? No Edema.? Peripheral pulses normal and equal in all extremities.  ABDOMEN:? Soft, without detectable tenderness.? No sign of distention.? No?? rebound or guarding, and no masses palpated.?? Bowel Sounds normal.  MUSCULOSKELETAL:? Good range of motion of all major joints. Extremities without clubbing, cyanosis or edema.?  NEUROLOGIC EXAM:? Alert and oriented x 3.? No focal sensory or strength deficits.?? Speech normal.? Follows  commands.  PSYCHIATRIC:? Mood normal.  SKIN:? No rash or lesions.  Breast: no masses , no nodules, no nipple discharge  Lymph nodes: no? supraclavicular, infraclavicular, axillary, or inguinal adenopathy  ECOG-0  Assessment/Plan  1.?Cancer of central portion of right female breast?C50.111  2.?Vitamin D deficiency?E55.9  3.?Post-menopause?Z78.0  The overall plan was to continue tamoxifen until March 2021 and then switch to an aromatase inhibitor.? For 5 years.  We discussed the use of aromatase inhibitors and zoladex  or change to AI now  Return to clinic in 6 months with? cbc/CMP, vitamin D panel-and based on her bone density results . will discuss change to AI on next visit  Bone density should be done every 2 years   Problem List/Past Medical History  Ongoing  Chronic back pain  Hay fever  High cholesterol  HTN - Hypertension  Obesity  Osteoarthritis  Post-menopause  Vitamin D deficiency  Procedure/Surgical History  Bilateral reconstruction of breasts (07.2016)  Bilateral mastectomy (12.2015)  Colonoscopy (2011)  Esophagoduodenostomy (2011)  Partial hysterectomy (2011)  Extraction of wisdom tooth   Medications  Atrovent 42 mcg/inh nasal spray, 2 spray(s)  cloNIDine, 0.2 mg, Oral, BID  Effexor XR 37.5 mg oral capsule, extended release, 37.5 mg= 1 cap(s), Oral, Daily  Flonase 50 mcg/inh nasal spray, 2 spray(s), Nasal, BID, PRN  metformin 500 mg oral tablet, 500 mg= 1 tab(s), Oral, BID  tamoxifen 20 mg oral tablet, 20 mg= 1 tab(s), Oral, Daily, 1 refills  traMADol 50 mg oral tablet, 50 mg= 1 tab(s), Oral, q4hr, PRN  Vitamin D3 50,000 intl units oral capsule, See Instructions, 1 refills  Xyzal 5 mg oral tablet, 5 mg= 1 tab(s), Oral, qPM, PRN  Zanaflex 6 mg oral capsule, 6 mg= 1 cap(s), Oral, TID, PRN  Allergies  No Known Allergies  Social History  Abuse/Neglect  No, 06/17/2019  Alcohol  Current, Beer, Wine, Liquor, 1-2 times per week, 12/10/2018  Substance Use  Never, 12/10/2018  Tobacco  Never (less than 100 in  lifetime), N/A, 06/17/2019  Never smoker, N/A, 12/10/2018  Family History  Atrial fibrillation: Mother.  Breast cancer: Paternal Grandmother.  Congestive heart disease.: Mother and Father.  Diabetes mellitus: Mother and Brother.  Hyperlipidemia.: Mother.  Hypertension.: Mother and Sister.  Liver cancer: Uncle.  Primary malignant neoplasm of colon: Aunt.  Primary malignant neoplasm of prostate: Uncle.  Health Maintenance  Health Maintenance  ???Pending?(in the next year)  ??? ??OverDue  ??? ? ? ?Alcohol Misuse Screening due??01/01/19??and every 1??year(s)  ??? ??Due?  ??? ? ? ?ADL Screening due??12/16/19??and every 1??year(s)  ??? ? ? ?Hypertension Management-Education due??12/16/19??and every 1??year(s)  ??? ? ? ?Influenza Vaccine due??12/16/19??and every?  ??? ? ? ?Tetanus Vaccine due??12/16/19??and every 10??year(s)  ??? ??Due In Future?  ??? ? ? ?Obesity Screening not due until??01/01/20??and every 1??year(s)  ??? ? ? ?Depression Screening not due until??06/16/20??and every 1??year(s)  ??? ? ? ?Hypertension Management-BMP not due until??06/16/20??and every 1??year(s)  ??? ? ? ?Blood Pressure Screening not due until??12/15/20??and every 1??year(s)  ??? ? ? ?Body Mass Index Check not due until??12/15/20??and every 1??year(s)  ??? ? ? ?Hypertension Management-Blood Pressure not due until??12/15/20??and every 1??year(s)  ???Satisfied?(in the past 1 year)  ??? ??Satisfied?  ??? ? ? ?Blood Pressure Screening on??12/16/19.??Satisfied by Ayala Cadena  ??? ? ? ?Body Mass Index Check on??12/16/19.??Satisfied by Ayala Cadena  ??? ? ? ?Depression Screening on??06/17/19.??Satisfied by Viviana Haines LPN  ??? ? ? ?Diabetes Screening on??06/17/19.??Satisfied by LeJeune, Stephanie A.  ??? ? ? ?Hypertension Management-Blood Pressure on??12/16/19.??Satisfied by Ayala Cadena  ??? ? ? ?Obesity Screening on??12/16/19.??Satisfied by Ayala Cadena  ?

## 2022-05-05 DIAGNOSIS — E55.9 VITAMIN D DEFICIENCY: Primary | ICD-10-CM

## 2022-05-05 RX ORDER — ERGOCALCIFEROL 1.25 MG/1
50000 CAPSULE ORAL
Qty: 6 CAPSULE | Refills: 1 | Status: SHIPPED | OUTPATIENT
Start: 2022-05-05 | End: 2023-04-24

## 2022-05-21 NOTE — HISTORICAL [facility] CERNER
This is a historical note converted from Naren. Formatting and pictures may have been removed.  Please reference Naren for original formatting and attached multimedia. Chief Complaint  Questions about her medication  History of Present Illness  office visit-breast cancer  Breast cancer follow up  ?   Problem list:?Malignant neoplasm of central portion of right female breast, T2N0(i+)? M0, diagnosed on 12/9/2015.  ?????????????????? Surgical pathology: revealed a 2.5 cm?right breast invasive lobular carcinoma, surgical marking margins negative?all 3?lymph nodes negative for metastatic disease?however positive by???????????  ???????????????????immunohistochemical staining,  ?????????????????? ER 96% MT 81%, HER-2 new?negative not amplified by FISH, Ki-67 4%  ?????????????????? Oncotype score of 10  ?  ?   Current treatment:?Tamoxifen  Treatment history:?  Bilateral simple mastectomies  Adjuvant Taxotere/Cytoxan?every 3 weeks x4, completed March 25, 2016.  reconstruction.  Genetic screening?BRCA negative  ?   Tamoxifen?March 2016,?- March 2021  Femara March 2021  ??????????????????????????????????????? Aromasin  ?  History of Present Illness  Patients ?history?starts?in 2015 where she had an abnormal mammogram?and a right breast mass.? Subsequently, biopsy confirmed?lobular carcinoma. ?She underwent bilateral simple mastectomies with a sentinel lymph node biopsy.? Genetic testing?and myriad risk was negative. She had an Oncotype diagnosis score of 10. Due to her strong family history and young age, she was given adjuvant chemotherapy with Taxotere and Cytoxan. She completed 4 cycles q 3 weeks in March 2016. She had reconstruction and was started on tamoxifen.  In 2017, she moved from [place]?to ?[place]. Dr. Cheng has been following her since then.  ?   Interim history:  .? The plan at that time was to change back to tamoxifen.? 3/14/2022: Patient was last seen September 2021  Review of Systems  Patient had a  bout of neuropathy in the hands and feet. ?It got progressively ascending. ?She also had some mild hair loss.? She was seen by her primary care?she was recommended to see neurology and rheumatology because of a positive GELY as well.  Her neuropathy is improved.? She is much better off the aromatase inhibitors. ?The hair loss is resolved. ?The body aches?has significantly improved.  Physical Exam  Vitals & Measurements  T:?36.9? ?C (Oral)? HR:?80(Peripheral)? BP:?121/78? SpO2:?97%?  HT:?167.60?cm? WT:?83.600?kg? BMI:?29.76?  VITAL SIGNS: ?Reviewed. ?[ ?]  GENERAL: [Well-developed well-nourished in no apparent cardiorespiratory distress] ?  HEAD: Normocephalic, atraumatic  EYES: ?Pupils are equal. ? reactive to light. ?Extraocular motions intact. ?No scleral icterus. ?No pallor.  EARS: ?Hearing grossly intact.  MOUTH: ?Oropharynx is clear without exudates or erythema.?  NECK: Supple no adenopathy, no JVD. ?Trachea midline  CHEST: ?Chest with clear breath sounds bilaterally. ?No wheezes, rales, or rhonchi. ?  CARDIAC: ?Regular rate and rhythm. ?S1 and S2, without murmurs, gallops, or rubs.  VASCULAR: ?No Edema. ?Peripheral pulses normal and equal in all extremities.  ABDOMEN: ?Soft, without detectable tenderness. ?No sign of distention. ?No ? rebound or guarding, and no masses palpated. ? Bowel Sounds normal.  MUSCULOSKELETAL: ?Good range of motion of all major joints. Extremities without clubbing, cyanosis or edema. ?  NEUROLOGIC EXAM: ?Alert and oriented x 3. ?No focal sensory or strength deficits. ? Speech normal. ?Follows commands.  PSYCHIATRIC: ?Mood normal.  SKIN: ?No rash or lesions.  Breast: no masses , no nodules, no nipple discharge?bilateral mastectomy with reconstruction  Lymph nodes: no ?supraclavicular, infraclavicular, axillary, or inguinal adenopathy  ECOG-1  Assessment/Plan  2.?Vitamin D deficiency?E55.9  Ordered:  Clinic Follow up, *Est. 09/14/22 3:00:00 CDT, 6m f/u w/Dr. Cheng, can do labs same  day-- latest appointment in afternoon due to travel, Order for future visit, Aromatase inhibitor use  Vitamin D deficiency  Cancer of central portion of right female breast, [facility]...  Comprehensive Metabolic Panel, Routine collect, *Est. 09/14/22 3:00:00 CDT, Blood, Order for future visit, *Est. Stop date 09/14/22 3:00:00 CDT, Lab Collect, Aromatase inhibitor use  Vitamin D deficiency  Cancer of central portion of right female breast, 03/14/22 15:15:00 CDT  Lab Draw, *Est. 09/14/22 3:00:00 CDT, Order for future visit, Aromatase inhibitor use  Vitamin D deficiency  Cancer of central portion of right female breast, [facility]  Office Visit Level 4 Est [id number], Aromatase inhibitor use  Vitamin D deficiency  Cancer of central portion of right female breast, 03/14/22 15:10:00 CDT  Vitamin D, 25-Hydroxy Level, Routine collect, *Est. 09/14/22 3:00:00 CDT, Blood, Order for future visit, *Est. Stop date 09/14/22 3:00:00 CDT, Lab Collect, Aromatase inhibitor use  Vitamin D deficiency  Cancer of central portion of right female breast, 03/14/22 15:15:00 CDT  ?  3.?Cancer of central portion of right female breast?C50.111  Ordered:  Clinic Follow up, *Est. 09/14/22 3:00:00 CDT, 6m f/u w/Dr. Cheng, can do labs same day-- latest appointment in afternoon due to travel, Order for future visit, Aromatase inhibitor use  Vitamin D deficiency  Cancer of central portion of right female breast, [facility]...  Comprehensive Metabolic Panel, ROUTINE collect, 03/14/22 14:32:28 CDT, BLOOD, Collected, Stop date 03/14/22 14:32:00 CDT, Lab Collect  Comprehensive Metabolic Panel, Routine collect, *Est. 09/14/22 3:00:00 CDT, Blood, Order for future visit, *Est. Stop date 09/14/22 3:00:00 CDT, Lab Collect, Aromatase inhibitor use  Vitamin D deficiency  Cancer of central portion of right female breast, 03/14/22 15:15:00 CDT  Lab Draw, *Est. 09/14/22 3:00:00 CDT, Order for future visit, Aromatase inhibitor use  Vitamin D  deficiency  Cancer of central portion of right female breast, [place]  Office Visit Level 4 Est [id number], Aromatase inhibitor use  Vitamin D deficiency  Cancer of central portion of right female breast, 03/14/22 15:10:00 CDT  Vitamin D, 25-Hydroxy Level, Routine collect, *Est. 09/14/22 3:00:00 CDT, Blood, Order for future visit, *Est. Stop date 09/14/22 3:00:00 CDT, Lab Collect, Aromatase inhibitor use  Vitamin D deficiency  Cancer of central portion of right female breast, 03/14/22 15:15:00 CDT  ?  Orders:  tamoxifen, 20 mg = 1 tab(s), Oral, Daily, # 90 tab(s), 2 Refill(s), Pharmacy: [id number], 167.6, cm, Height/Length Dosing, 03/14/22 14:48:00 CDT, 83.6, kg, Weight Dosing, 03/14/22 14:48:00 CDT  Options of therapy discussed,: Changed to Arimidex,?changed back to tamoxifen  This a 50-year-old postmenopausal female now with an Oncotype of 10. ?She was treated years ago with Taxotere Cytoxan?and then tamoxifen x5 years. ?She was recently changed aromatase inhibitor in March 2021. (tried?all three,?letrozole and Arimidex  and Aromasin.. discussed extended tamoxifen at 51  ?   RTC in 6months  can consider re-trial of AI in future  ?otherwise continue Tamoxifen till March of 2026  ?   f/u with primary for +GELY and neuropathy  and hair loss  bone density will make sure she get one schedule after next visit  Referrals  Clinic Follow up, *Est. 09/14/22 3:00:00 CDT, 6m f/u w/Dr. Cheng, can do labs same day-- latest appointment in afternoon due to travel, Order for future visit, Aromatase inhibitor use  Vitamin D deficiency  [facility]  Lab Draw, *Est. 09/14/22 3:00:00 CDT, Order for future visit, Aromatase inhibitor use  Vitamin D deficiency  Cancer of central portion of right female breast, [place]   Problem List/Past Medical History  Ongoing  Aromatase inhibitor use  Chronic back pain  Hay fever  High cholesterol  HTN -  Hypertension  Obesity  Osteoarthritis  Post-menopause  Vitamin D deficiency  Procedure/Surgical History  Bilateral reconstruction of breasts (07.2016)  Bilateral mastectomy (12.2015)  Colonoscopy (2011)  Esophagoduodenostomy (2011)  Partial hysterectomy (2011)  Extraction of wisdom tooth   Medications  Atrovent 42 mcg/inh nasal spray, 2 spray(s)  azelastine 137 mcg/inh (0.1%) nasal spray, 2 spray(s), Both Nostrils, BID  cloniDINE 0.2 mg oral tablet, 0.2 mg= 1 tab(s), Oral, BID  conjugated estrogens 0.625 mg/g vaginal cream with applicator, 1 gm, VAG, qPM  diclofenac sodium 50 mg oral delayed release tablet, 50 mg= 1 tab(s), Oral, BID  Flonase 50 mcg/inh nasal spray, 2 spray(s), Nasal, BID, PRN  ipratropium 21 mcg/inh (0.03%) nasal spray, 2 spray(s), Nasal, TID  metformin 500 mg oral tablet, 500 mg= 1 tab(s), Oral, BID  metoprolol succinate 25 mg oral tablet, extended release, 25 mg= 1 tab(s), Oral, Daily  metroNIDAZOLE 0.75% topical gel, 1 earl, TOP, BID  promethazine 25 mg oral tablet, 25 mg= 1 tab(s), Oral, q6hr  tamoxifen 20 mg oral tablet, 20 mg= 1 tab(s), Oral, Daily, 2 refills  temazepam 7.5 mg oral capsule, 7.5 mg= 1 cap(s), Oral, Once a day (at bedtime)  tiZANidine 4 mg oral tablet, 4 mg= 1 tab(s), Oral, BID  traMADol 50 mg oral tablet, 50 mg= 1 tab(s), Oral, q4hr, PRN  tretinoin 0.025% topical cream, 1 earl, TOP, qPM  valacyclovir 500 mg oral tablet, 500 mg= 1 tab(s), Oral, Daily  valsartan 40 mg oral tablet, 40 mg= 1 tab(s), Oral, Daily  Vitamin D3 50,000 intl units (1250 mcg) oral capsule, See Instructions, 1 refills  Xyzal 5 mg oral tablet, 5 mg= 1 tab(s), Oral, qPM, PRN  Allergies  No Known Allergies  Social History  Abuse/Neglect  No, 05/10/2021  Alcohol  Current, 1-2 times per week, 05/10/2021  Substance Use  Never, 05/10/2021  Tobacco  Never (less than 100 in lifetime), N/A, 05/10/2021  Family History  Atrial fibrillation: Mother.  Breast cancer: Paternal Grandmother.  Congestive heart disease.: Mother  and Father.  Diabetes mellitus: Mother and Brother.  Hyperlipidemia.: Mother.  Hypertension.: Mother and Sister.  Liver cancer: Uncle.  Primary malignant neoplasm of colon: Aunt.  Primary malignant neoplasm of prostate: Uncle.  Health Maintenance  Health Maintenance  ???Pending?(in the next year)  ??? ??OverDue  ??? ? ? ?Influenza Vaccine due??10/01/21??and every 1??day(s)  ??? ? ? ?Colorectal Screening due??10/29/21??and every 10??year(s)  ??? ? ? ?Alcohol Misuse Screening due??01/02/22??and every 1??year(s)  ??? ??Due?  ??? ? ? ?ADL Screening due??03/14/22??and every 1??year(s)  ??? ? ? ?Hypertension Management-Education due??03/14/22??and every 1??year(s)  ??? ? ? ?Tetanus Vaccine due??03/14/22??and every 10??year(s)  ??? ? ? ?Zoster Vaccine due??03/14/22??Unknown Frequency  ??? ??Due In Future?  ??? ? ? ?Hypertension Management-BMP not due until??09/13/22??and every 1??year(s)  ??? ? ? ?Obesity Screening not due until??01/01/23??and every 1??year(s)  ???Satisfied?(in the past 1 year)  ??? ??Satisfied?  ??? ? ? ?Blood Pressure Screening on??03/14/22.??Satisfied by Radha Vega  ??? ? ? ?Body Mass Index Check on??03/14/22.??Satisfied by Radha Vega  ??? ? ? ?Depression Screening on??03/14/22.??Satisfied by Radha Vega  ??? ? ? ?Diabetes Screening on??09/13/21.??Satisfied by Melanie Morrissey  ??? ? ? ?Hypertension Management-Blood Pressure on??03/14/22.??Satisfied by Radha Vega  ??? ? ? ?Obesity Screening on??03/14/22.??Satisfied by Radha Vega  ?  Lab Results  Test Name Test Result Date/Time   WBC 5.0 x10(3)/mcL 03/14/2022 14:32 CDT   RBC 4.18 x10(6)/mcL (Low) 03/14/2022 14:32 CDT   Hgb 12.5 gm/dL 03/14/2022 14:32 CDT   Hct 39.2 % 03/14/2022 14:32 CDT   Platelet 185 x10(3)/mcL 03/14/2022 14:32 CDT   MCV 93.8 fL 03/14/2022 14:32 CDT   MCH 29.9 pg 03/14/2022 14:32 CDT   MCHC 31.9 gm/dL (Low) 03/14/2022 14:32 CDT   RDW 13.8 % 03/14/2022 14:32 CDT   MPV 9.1 fL (Low) 03/14/2022 14:32 CDT    Abs Neut 2.76 x10(3)/mcL 03/14/2022 14:32 CDT   NEUT% 54.8 % 03/14/2022 14:32 CDT

## 2022-08-19 ENCOUNTER — PATIENT MESSAGE (OUTPATIENT)
Dept: HEMATOLOGY/ONCOLOGY | Facility: CLINIC | Age: 52
End: 2022-08-19
Payer: COMMERCIAL

## 2022-09-16 RX ORDER — OMEPRAZOLE 20 MG/1
CAPSULE, DELAYED RELEASE ORAL
COMMUNITY
Start: 2022-07-01

## 2022-09-16 RX ORDER — TAMOXIFEN CITRATE 20 MG/1
20 TABLET ORAL DAILY
COMMUNITY
Start: 2022-06-11 | End: 2022-09-19 | Stop reason: SDUPTHER

## 2022-09-16 NOTE — PROGRESS NOTES
Heme/Onc Progress Note                                                                   Telephone visit    PATIENT: Linda Wall  MRN: [id number]  DATE: 9/19/2022  Chief Complaint: Follow-up (Telephone visit)    Current Treatment:  Tamoxifen     Oncology History   Breast cancer   12/9/2015 Initial Diagnosis    Breast cancer     12/9/2015 Cancer Staged    Staging form: Onc Breast AJCC V7  - Pathologic stage from 12/9/2015: Stage IIA (T2, N0(i+), cM0)       12/9/2015 Surgery    Bilateral Mastectomies       Genetic Testing    Genetic screening BRCA negative     12/9/2015 Initial Diagnosis    Malignant neoplasm of central portion of right female breast, T2N0(i+) M0, diagnosed on 12/9/2015.  Surgical pathology: revealed a 2.5 cm right breast invasive lobular carcinoma, surgical marking margins negative all 3 lymph nodes negative for metastatic disease however positive by   immunohistochemical staining,  ER 96% NH 81%, HER-2 new negative not amplified by FISH, Ki-67 4%  Oncotype score of 10         2016 - 3/25/2016 Chemotherapy    Taxotere/Cytoxan every 3 weeks x4, completed March 25, 2016.  reconstruction.       3/2016 - 3/2021 Hormone Therapy    Tamoxifen     3/2021 -  Hormone Therapy    Aromatase inhibitor trial, letrozole, Arimidex, Aromasin (patient intolerant to all 3)      Hormone Therapy    Current treatment: Tamoxifen         09/19/2022  This was a telephone visit.  The patient reports she has been on a 18 mi bike ride.  She just renal on vacation.  She still has some mild dyspnea.  She is tolerating her tamoxifen well.  She misplaced her prescription vitamin-D visit taking vitamin-D over-the-counter.  She gained 10 lb.  Bony aches and the alopecia have improved, she still has difficulty with rosacea.  Her A1c has been less than 6.  And her outside labs room today.  Scanned into the chart.    Past Medical History:   Diagnosis Date    Abnormal Pap smear of cervix     Acid reflux     Breast cancer      Diabetes mellitus     Herpes simplex virus (HSV) infection     Hyperlipidemia     Hypertension     Mental disorder     Osteoarthritis         Current Outpatient Medications:     aspirin (ECOTRIN) 81 MG EC tablet, Take 81 mg by mouth once daily., Disp: , Rfl:     azelastine (ASTELIN) 137 mcg (0.1 %) nasal spray, SMARTSIG:Both Nares, Disp: , Rfl:     bepotastine besilate (BEPREVE) 1.5 % Drop, Apply to eye., Disp: , Rfl:     cloNIDine (CATAPRES) 0.2 MG tablet, Take 1 tablet (0.2 mg total) by mouth 2 (two) times daily as needed., Disp: 180 tablet, Rfl: 3    diclofenac (VOLTAREN) 50 MG EC tablet, Take 50 mg by mouth 2 (two) times daily., Disp: , Rfl:     ergocalciferol (VITAMIN D2) 50,000 unit Cap, Take 1 capsule (50,000 Units total) by mouth every 30 days., Disp: 6 capsule, Rfl: 1    estradioL (VAGIFEM) 10 mcg Tab, 1 tab vaginally q hs x 14 nights then twice weekly, Disp: 18 tablet, Rfl: 3    fluticasone propionate (FLONASE ALLERGY RELIEF) 50 mcg/actuation nasal spray, 1 spray (50 mcg total) by Each Nare route once daily., Disp: 16 g, Rfl: 5    ipratropium (ATROVENT) 21 mcg (0.03 %) nasal spray, SMARTSI Both Nares Every Night, Disp: , Rfl:     levocetirizine (XYZAL) 5 MG tablet, Take 1 tablet (5 mg total) by mouth every evening., Disp: 90 tablet, Rfl: 1    metFORMIN (GLUCOPHAGE-XR) 500 MG 24 hr tablet, Take 1 tablet (500 mg total) by mouth 2 (two) times daily with meals., Disp: 180 tablet, Rfl: 3    metoprolol succinate (TOPROL-XL) 25 MG 24 hr tablet, Take 12.5 mg by mouth once daily., Disp: , Rfl:     metroNIDAZOLE (METROGEL) 0.75 % gel, Apply 1 application topically nightly., Disp: , Rfl:     omeprazole (PRILOSEC) 20 MG capsule, , Disp: , Rfl:     promethazine (PHENERGAN) 25 MG tablet, Take 25 mg by mouth 2 (two) times daily as needed., Disp: , Rfl:     temazepam (RESTORIL) 7.5 MG Cap, Take 7.5 mg by mouth nightly., Disp: , Rfl:     tiZANidine (ZANAFLEX) 4 MG tablet, Take 1 tablet (4 mg total) by mouth every 8  (eight) hours as needed., Disp: 180 tablet, Rfl: 2    traMADol (ULTRAM) 50 mg tablet, Take 1 tablet (50 mg total) by mouth every 6 (six) hours as needed for Pain., Disp: 100 tablet, Rfl: 2    tretinoin (RETIN-A) 0.025 % cream, Apply 1 application topically nightly., Disp: , Rfl:     valACYclovir (VALTREX) 500 MG tablet, Take 500 mg by mouth once daily., Disp: , Rfl:     valsartan (DIOVAN) 40 MG tablet, Take 40 mg by mouth once daily., Disp: , Rfl:     tamoxifen (NOLVADEX) 20 MG Tab, Take 1 tablet (20 mg total) by mouth once daily., Disp: 90 tablet, Rfl: 1     Review of Systems:   Pertinent positives and negatives included in the HPI. Otherwise a complete review of systems is negative.      Objective:   There were no vitals filed for this visit.      Physical Exam    Telephone visit,   Patient alert oriented no apparent cardiac or respiratory distress over the phone speaking complete sentences.      Problem List Items Addressed This Visit          Oncology    Breast cancer - Primary (Chronic)    Current Assessment & Plan     Options of therapy discussed,: Changed to Arimidex, changed back to tamoxifen  This a 50-year-old postmenopausal female now with an Oncotype of 10. She was treated years ago with Taxotere Cytoxan and then tamoxifen x5 years. She was recently changed aromatase inhibitor in March 2021. (tried all three, letrozole and Arimidex  and Aromasin.. discussed extended tamoxifen at 51    [facility] in 6months  can consider re-trial of AI in future  otherwise continue Tamoxifen till March of 2026    f/u with primary for +GELY and neuropathy  and hair loss  bone density will make sure she get one schedule after next visit           Relevant Medications    tamoxifen (NOLVADEX) 20 MG Tab    Other Relevant Orders    CBC Auto Differential    Comprehensive Metabolic Panel     Other Visit Diagnoses       Long term (current) use of aromatase inhibitors        Relevant Orders    Vitamin D              Follow up in about 6  months (around 3/19/2023) for with Dr. Cheng.    Nir Cheng MD    Telemed: This visit was a telephone visit.  Real-time audio and video were used following [facility] protocols.  The patient and/or family agreed to the security of information at the location.  I was located in the [facility] with this visit occurred.  The patient was located work in [place]  I am licensed to practice in the [facility].

## 2022-09-16 NOTE — ASSESSMENT & PLAN NOTE
Options of therapy discussed,: Changed to Arimidex, changed back to tamoxifen  This a 50-year-old postmenopausal female now with an Oncotype of 10. She was treated years ago with Taxotere Cytoxan and then tamoxifen x5 years. She was recently changed aromatase inhibitor in March 2021. (tried all three, letrozole and Arimidex  and Aromasin.. discussed extended tamoxifen at 51    RTC in 6months  can consider re-trial of AI in future  otherwise continue Tamoxifen till March of 2026    f/u with primary for +GELY and neuropathy  and hair loss  bone density will make sure she get one schedule after next visit

## 2022-09-19 ENCOUNTER — OFFICE VISIT (OUTPATIENT)
Dept: HEMATOLOGY/ONCOLOGY | Facility: CLINIC | Age: 52
End: 2022-09-19
Payer: COMMERCIAL

## 2022-09-19 DIAGNOSIS — C50.919 MALIGNANT NEOPLASM OF FEMALE BREAST, UNSPECIFIED ESTROGEN RECEPTOR STATUS, UNSPECIFIED LATERALITY, UNSPECIFIED SITE OF BREAST: Primary | Chronic | ICD-10-CM

## 2022-09-19 DIAGNOSIS — Z79.811 LONG TERM (CURRENT) USE OF AROMATASE INHIBITORS: ICD-10-CM

## 2022-09-19 PROCEDURE — 4010F ACE/ARB THERAPY RXD/TAKEN: CPT | Mod: CPTII,95,, | Performed by: INTERNAL MEDICINE

## 2022-09-19 PROCEDURE — 99214 PR OFFICE/OUTPT VISIT, EST, LEVL IV, 30-39 MIN: ICD-10-PCS | Mod: 95,,, | Performed by: INTERNAL MEDICINE

## 2022-09-19 PROCEDURE — 99214 OFFICE O/P EST MOD 30 MIN: CPT | Mod: 95,,, | Performed by: INTERNAL MEDICINE

## 2022-09-19 PROCEDURE — 1160F PR REVIEW ALL MEDS BY PRESCRIBER/CLIN PHARMACIST DOCUMENTED: ICD-10-PCS | Mod: CPTII,95,, | Performed by: INTERNAL MEDICINE

## 2022-09-19 PROCEDURE — 1159F PR MEDICATION LIST DOCUMENTED IN MEDICAL RECORD: ICD-10-PCS | Mod: CPTII,95,, | Performed by: INTERNAL MEDICINE

## 2022-09-19 PROCEDURE — 4010F PR ACE/ARB THEARPY RXD/TAKEN: ICD-10-PCS | Mod: CPTII,95,, | Performed by: INTERNAL MEDICINE

## 2022-09-19 PROCEDURE — 1160F RVW MEDS BY RX/DR IN RCRD: CPT | Mod: CPTII,95,, | Performed by: INTERNAL MEDICINE

## 2022-09-19 PROCEDURE — 1159F MED LIST DOCD IN RCRD: CPT | Mod: CPTII,95,, | Performed by: INTERNAL MEDICINE

## 2022-09-19 RX ORDER — TAMOXIFEN CITRATE 20 MG/1
20 TABLET ORAL DAILY
Qty: 90 TABLET | Refills: 1 | Status: SHIPPED | OUTPATIENT
Start: 2022-09-19 | End: 2022-12-07

## 2023-03-06 NOTE — PROGRESS NOTES
Heme/Onc Progress Note                                                                       PATIENT: Linda Wall  MRN: [id number]  DATE: 3/13/2023  Chief Complaint: No concerns today      Current Treatment:  Tamoxifen     Oncology History   Breast cancer   12/9/2015 Initial Diagnosis    Breast cancer     12/9/2015 Cancer Staged    Staging form: Onc Breast AJCC V7  - Pathologic stage from 12/9/2015: Stage IIA (T2, N0(i+), cM0)       12/9/2015 Surgery    Bilateral Mastectomies       Genetic Testing    Genetic screening BRCA negative     12/9/2015 Initial Diagnosis    Malignant neoplasm of central portion of right female breast, T2N0(i+) M0, diagnosed on 12/9/2015.  Surgical pathology: revealed a 2.5 cm right breast invasive lobular carcinoma, surgical marking margins negative all 3 lymph nodes negative for metastatic disease however positive by   immunohistochemical staining,  ER 96% VT 81%, HER-2 new negative not amplified by FISH, Ki-67 4%  Oncotype score of 10         2016 - 3/25/2016 Chemotherapy    Taxotere/Cytoxan every 3 weeks x4, completed March 25, 2016.  reconstruction.       3/2016 - 3/2021 Hormone Therapy    Tamoxifen     3/2021 -  Hormone Therapy    Aromatase inhibitor trial, letrozole, Arimidex, Aromasin (patient intolerant to all 3)      Hormone Therapy    Current treatment: Tamoxifen         03/13/2023  Patient presents for 6 month follow up. She continues to take Tamoxifen and is tolerating well. She did have a significant loss of weight with her new medication and diet/lifestyle changes. She reports having bone density and that she has the bones of a 26 year old.   To have a positive GELY, positive thyroid antibody, she does have a positive family history where she states her dad had a positive rheumatoid factor.  But she does not know whether not hit her underlying disease.  She had 1 or 2 episodes of transient numbness or tingling, paresthesias had a negative EMG study, she is lost  significant amount of weight since being on Ozempic and changing her diet and lifestyle as stated above.  She still has vaginal dryness hot flashes and mood swings are much better with her weight loss and new medication.    We discussed the pros and cons of aromatase inhibitor    Past Medical History:   Diagnosis Date    Abnormal Pap smear of cervix     Acid reflux     Breast cancer     Diabetes mellitus     Herpes simplex virus (HSV) infection     Hyperlipidemia     Hypertension     Mental disorder     Osteoarthritis         Current Outpatient Medications:     aspirin (ECOTRIN) 81 MG EC tablet, Take 81 mg by mouth once daily., Disp: , Rfl:     azelastine (ASTELIN) 137 mcg (0.1 %) nasal spray, SMARTSIG:Both Nares, Disp: , Rfl:     cloNIDine (CATAPRES) 0.2 MG tablet, Take 1 tablet (0.2 mg total) by mouth 2 (two) times daily as needed., Disp: 180 tablet, Rfl: 3    diclofenac (VOLTAREN) 50 MG EC tablet, Take 50 mg by mouth 2 (two) times daily., Disp: , Rfl:     ergocalciferol (VITAMIN D2) 50,000 unit Cap, Take 1 capsule (50,000 Units total) by mouth every 30 days., Disp: 6 capsule, Rfl: 1    estradioL (VAGIFEM) 10 mcg Tab, INSERT ONE TABLET VAGINALLY AT BEDTIME FOR 14 DAYS THEN TWICE WEEKLY, Disp: 18 tablet, Rfl: 3    fluticasone propionate (FLONASE ALLERGY RELIEF) 50 mcg/actuation nasal spray, 1 spray (50 mcg total) by Each Nare route once daily., Disp: 16 g, Rfl: 5    ipratropium (ATROVENT) 21 mcg (0.03 %) nasal spray, SMARTSI Both Nares Every Night, Disp: , Rfl:     levocetirizine (XYZAL) 5 MG tablet, Take 1 tablet (5 mg total) by mouth every evening., Disp: 90 tablet, Rfl: 1    metFORMIN (GLUCOPHAGE) 500 MG tablet, Take 500 mg by mouth., Disp: , Rfl:     metoprolol succinate (TOPROL-XL) 25 MG 24 hr tablet, Take 12.5 mg by mouth once daily., Disp: , Rfl:     metroNIDAZOLE (METROGEL) 0.75 % gel, Apply 1 application topically nightly., Disp: , Rfl:     omeprazole (PRILOSEC) 20 MG capsule, , Disp: , Rfl:     OZEMPIC  1 mg/dose (4 mg/3 mL), Inject into the skin., Disp: , Rfl:     promethazine (PHENERGAN) 25 MG tablet, Take 25 mg by mouth 2 (two) times daily as needed., Disp: , Rfl:     temazepam (RESTORIL) 7.5 MG Cap, Take 7.5 mg by mouth nightly., Disp: , Rfl:     tiZANidine (ZANAFLEX) 4 MG tablet, Take 1 tablet (4 mg total) by mouth every 8 (eight) hours as needed., Disp: 180 tablet, Rfl: 2    traMADol (ULTRAM) 50 mg tablet, Take 1 tablet (50 mg total) by mouth every 6 (six) hours as needed for Pain., Disp: 100 tablet, Rfl: 2    tretinoin (RETIN-A) 0.025 % cream, Apply 1 application topically nightly., Disp: , Rfl:     valACYclovir (VALTREX) 500 MG tablet, Take 500 mg by mouth once daily., Disp: , Rfl:     valsartan (DIOVAN) 40 MG tablet, Take 40 mg by mouth once daily., Disp: , Rfl:     bepotastine besilate (BEPREVE) 1.5 % Drop, Apply to eye., Disp: , Rfl:     tamoxifen (NOLVADEX) 20 MG Tab, Take 1 tablet (20 mg total) by mouth once daily., Disp: 90 tablet, Rfl: 1     Review of Systems:   Review of Systems  Pertinent positives and negatives included in the HPI. Otherwise a complete review of systems is negative.      Objective:     Vitals:    03/13/23 1036   BP: 102/68   Pulse: 75   Temp: 97.6 °F (36.4 °C)         Physical Exam  Vitals reviewed.   Constitutional:       General: She is awake.      Appearance: Normal appearance.   HENT:      Head: Normocephalic and atraumatic.      Mouth/Throat:      Mouth: Mucous membranes are moist.      Pharynx: Oropharynx is clear.   Eyes:      Extraocular Movements: Extraocular movements intact.      Conjunctiva/sclera: Conjunctivae normal.      Pupils: Pupils are equal, round, and reactive to light.   Cardiovascular:      Rate and Rhythm: Normal rate and regular rhythm.      Pulses: Normal pulses.      Heart sounds: Normal heart sounds.   Pulmonary:      Effort: Pulmonary effort is normal.      Breath sounds: Normal breath sounds and air entry.   Chest:   Breasts:     Right: Normal.       Left: Normal.   Abdominal:      General: Abdomen is flat. Bowel sounds are normal.      Palpations: Abdomen is soft.      Tenderness: There is no abdominal tenderness.   Musculoskeletal:      Cervical back: Normal range of motion.      Right lower leg: No edema.      Left lower leg: No edema.   Lymphadenopathy:      Cervical: No cervical adenopathy.      Upper Body:      Right upper body: No axillary adenopathy.      Left upper body: No axillary adenopathy.      Lower Body: No right inguinal adenopathy. No left inguinal adenopathy.   Skin:     General: Skin is warm and dry.   Neurological:      General: No focal deficit present.      Mental Status: She is alert and oriented to person, place, and time. Mental status is at baseline.   Psychiatric:         Attention and Perception: Attention normal.         Mood and Affect: Mood and affect normal.         Behavior: Behavior normal. Behavior is cooperative.         Thought Content: Thought content normal.         Judgment: Judgment normal.         Lab Review:  Results for orders placed or performed in visit on 03/13/23   CBC with Differential   Result Value Ref Range    WBC 6.4 4.5 - 11.5 x10(3)/mcL    RBC 4.04 (L) 4.20 - 5.40 x10(6)/mcL    Hgb 12.6 12.0 - 16.0 g/dL    Hct 39.4 37.0 - 47.0 %    MCV 97.5 (H) 80.0 - 94.0 fL    MCH 31.2 pg    MCHC 32.0 (L) 33.0 - 36.0 g/dL    RDW 13.8 11.5 - 17.0 %    Platelet 207 130 - 400 x10(3)/mcL    MPV 8.8 7.4 - 10.4 fL    Neut % 56.8 %    Lymph % 31.2 %    Mono % 8.0 %    Eos % 3.3 %    Basophil % 0.5 %    Lymph # 1.99 0.6 - 4.6 x10(3)/mcL    Neut # 3.63 2.1 - 9.2 x10(3)/mcL    Mono # 0.51 0.1 - 1.3 x10(3)/mcL    Eos # 0.21 0 - 0.9 x10(3)/mcL    Baso # 0.03 0 - 0.2 x10(3)/mcL    IG# 0.01 0 - 0.04 x10(3)/mcL    IG% 0.2 %        Impression  52-year-old postmenopausal female Oncotype of 10 with a history of breast carcinoma  Current use of tamoxifen       Options of therapy discussed,: Changed to Arimidex, cotinue tamoxifen  This a  50-year-old postmenopausal female now with an Oncotype of 10. She was treated years ago with Taxotere Cytoxan and then tamoxifen x5 years. She was recently changed aromatase inhibitor in March 2021. (tried all three, letrozole and Arimidex  and Aromasin.. discussed extended tamoxifen at 51    RTC in 6 months   can consider re-trial of AI in future  otherwise continue Tamoxifen till March of 2026    f/u with primary for +GELY and neuropathy, and hair loss        Follow up in about 6 months (around 9/13/2023).    Nir Cheng MD

## 2023-03-13 ENCOUNTER — OFFICE VISIT (OUTPATIENT)
Dept: HEMATOLOGY/ONCOLOGY | Facility: CLINIC | Age: 53
End: 2023-03-13
Payer: COMMERCIAL

## 2023-03-13 VITALS
HEART RATE: 75 BPM | WEIGHT: 155 LBS | BODY MASS INDEX: 25.83 KG/M2 | DIASTOLIC BLOOD PRESSURE: 68 MMHG | OXYGEN SATURATION: 100 % | TEMPERATURE: 98 F | HEIGHT: 65 IN | SYSTOLIC BLOOD PRESSURE: 102 MMHG

## 2023-03-13 DIAGNOSIS — C50.919 MALIGNANT NEOPLASM OF FEMALE BREAST, UNSPECIFIED ESTROGEN RECEPTOR STATUS, UNSPECIFIED LATERALITY, UNSPECIFIED SITE OF BREAST: Chronic | ICD-10-CM

## 2023-03-13 PROCEDURE — 3008F BODY MASS INDEX DOCD: CPT | Mod: CPTII,S$GLB,, | Performed by: INTERNAL MEDICINE

## 2023-03-13 PROCEDURE — 3074F PR MOST RECENT SYSTOLIC BLOOD PRESSURE < 130 MM HG: ICD-10-PCS | Mod: CPTII,S$GLB,, | Performed by: INTERNAL MEDICINE

## 2023-03-13 PROCEDURE — 1159F MED LIST DOCD IN RCRD: CPT | Mod: CPTII,S$GLB,, | Performed by: INTERNAL MEDICINE

## 2023-03-13 PROCEDURE — 3078F PR MOST RECENT DIASTOLIC BLOOD PRESSURE < 80 MM HG: ICD-10-PCS | Mod: CPTII,S$GLB,, | Performed by: INTERNAL MEDICINE

## 2023-03-13 PROCEDURE — 3008F PR BODY MASS INDEX (BMI) DOCUMENTED: ICD-10-PCS | Mod: CPTII,S$GLB,, | Performed by: INTERNAL MEDICINE

## 2023-03-13 PROCEDURE — 1159F PR MEDICATION LIST DOCUMENTED IN MEDICAL RECORD: ICD-10-PCS | Mod: CPTII,S$GLB,, | Performed by: INTERNAL MEDICINE

## 2023-03-13 PROCEDURE — 4010F PR ACE/ARB THEARPY RXD/TAKEN: ICD-10-PCS | Mod: CPTII,S$GLB,, | Performed by: INTERNAL MEDICINE

## 2023-03-13 PROCEDURE — 99999 PR PBB SHADOW E&M-EST. PATIENT-LVL V: ICD-10-PCS | Mod: PBBFAC,,, | Performed by: INTERNAL MEDICINE

## 2023-03-13 PROCEDURE — 99214 PR OFFICE/OUTPT VISIT, EST, LEVL IV, 30-39 MIN: ICD-10-PCS | Mod: S$GLB,,, | Performed by: INTERNAL MEDICINE

## 2023-03-13 PROCEDURE — 3074F SYST BP LT 130 MM HG: CPT | Mod: CPTII,S$GLB,, | Performed by: INTERNAL MEDICINE

## 2023-03-13 PROCEDURE — 3078F DIAST BP <80 MM HG: CPT | Mod: CPTII,S$GLB,, | Performed by: INTERNAL MEDICINE

## 2023-03-13 PROCEDURE — 99214 OFFICE O/P EST MOD 30 MIN: CPT | Mod: S$GLB,,, | Performed by: INTERNAL MEDICINE

## 2023-03-13 PROCEDURE — 4010F ACE/ARB THERAPY RXD/TAKEN: CPT | Mod: CPTII,S$GLB,, | Performed by: INTERNAL MEDICINE

## 2023-03-13 PROCEDURE — 99999 PR PBB SHADOW E&M-EST. PATIENT-LVL V: CPT | Mod: PBBFAC,,, | Performed by: INTERNAL MEDICINE

## 2023-03-13 RX ORDER — SEMAGLUTIDE 1.34 MG/ML
INJECTION, SOLUTION SUBCUTANEOUS
COMMUNITY
Start: 2023-03-11 | End: 2023-09-25

## 2023-03-13 RX ORDER — METFORMIN HYDROCHLORIDE 500 MG/1
500 TABLET ORAL
COMMUNITY
Start: 2022-12-06

## 2023-03-13 RX ORDER — TAMOXIFEN CITRATE 20 MG/1
20 TABLET ORAL DAILY
Qty: 90 TABLET | Refills: 1 | Status: SHIPPED | OUTPATIENT
Start: 2023-03-13 | End: 2023-09-08

## 2023-09-08 DIAGNOSIS — C50.919 MALIGNANT NEOPLASM OF FEMALE BREAST, UNSPECIFIED ESTROGEN RECEPTOR STATUS, UNSPECIFIED LATERALITY, UNSPECIFIED SITE OF BREAST: Chronic | ICD-10-CM

## 2023-09-08 RX ORDER — TAMOXIFEN CITRATE 20 MG/1
20 TABLET ORAL
Qty: 90 TABLET | Refills: 0 | Status: SHIPPED | OUTPATIENT
Start: 2023-09-08 | End: 2023-09-25 | Stop reason: SDUPTHER

## 2023-09-24 NOTE — PROGRESS NOTES
Heme/Onc Progress Note                                                                       PATIENT: Linda Wall  MRN: [id number]  DATE: 9/25/2023  Chief Complaint: Follow-up (Patient is here for her 6 month visit)      Current Treatment:  Tamoxifen     Oncology History   Malignant neoplasm of central portion of right breast in female, estrogen receptor positive   12/9/2015 Initial Diagnosis    Breast cancer     12/9/2015 Cancer Staged    Staging form: Onc Breast AJCC V7  - Pathologic stage from 12/9/2015: Stage IIA (T2, N0(i+), cM0)     12/9/2015 Surgery    Bilateral Mastectomies       Genetic Testing    Genetic screening BRCA negative     12/9/2015 Initial Diagnosis    Malignant neoplasm of central portion of right female breast, T2N0(i+) M0, diagnosed on 12/9/2015.  Surgical pathology: revealed a 2.5 cm right breast invasive lobular carcinoma, surgical marking margins negative all 3 lymph nodes negative for metastatic disease however positive by   immunohistochemical staining,  ER 96% LA 81%, HER-2 new negative not amplified by FISH, Ki-67 4%  Oncotype score of 10         2016 - 3/25/2016 Chemotherapy    Taxotere/Cytoxan every 3 weeks x4, completed March 25, 2016.  reconstruction.       3/2016 - 3/2021 Hormone Therapy    Tamoxifen     3/2021 -  Hormone Therapy    Aromatase inhibitor trial, letrozole, Arimidex, Aromasin (patient intolerant to all 3)      Hormone Therapy    Current treatment: Tamoxifen         09/25/2023  Patient presents for 6 month follow up. She continues to take           Past Medical History:   Diagnosis Date    Abnormal Pap smear of cervix     Acid reflux     Breast cancer     Diabetes mellitus     Herpes simplex virus (HSV) infection     Hyperlipidemia     Hypertension     Mental disorder     Osteoarthritis         Current Outpatient Medications:     aspirin (ECOTRIN) 81 MG EC tablet, Take 81 mg by mouth once daily., Disp: , Rfl:     bepotastine besilate (BEPREVE) 1.5 %  Drop, Apply to eye., Disp: , Rfl:     celecoxib (CELEBREX) 200 MG capsule, Take 200 mg by mouth., Disp: , Rfl:     ciclopirox (PENLAC) 8 % Soln, Apply topically., Disp: , Rfl:     cloNIDine (CATAPRES) 0.2 MG tablet, Take 1 tablet (0.2 mg total) by mouth 2 (two) times daily as needed., Disp: 180 tablet, Rfl: 3    estradioL (VAGIFEM) 10 mcg Tab, INSERT ONE TABLET VAGINALLY AT BEDTIME FOR 14 DAYS THEN TWICE WEEKLY, Disp: 18 tablet, Rfl: 3    fluticasone propionate (FLONASE ALLERGY RELIEF) 50 mcg/actuation nasal spray, 1 spray (50 mcg total) by Each Nare route once daily., Disp: 16 g, Rfl: 5    ipratropium (ATROVENT) 21 mcg (0.03 %) nasal spray, SMARTSI Both Nares Every Night, Disp: , Rfl:     levocetirizine (XYZAL) 5 MG tablet, Take 1 tablet (5 mg total) by mouth every evening., Disp: 90 tablet, Rfl: 1    metFORMIN (GLUCOPHAGE) 500 MG tablet, Take 500 mg by mouth., Disp: , Rfl:     metoprolol succinate (TOPROL-XL) 25 MG 24 hr tablet, Take 12.5 mg by mouth once daily., Disp: , Rfl:     minocycline (MINOCIN,DYNACIN) 100 MG capsule, Take by mouth., Disp: , Rfl:     omeprazole (PRILOSEC) 20 MG capsule, , Disp: , Rfl:     OZEMPIC 2 mg/dose (8 mg/3 mL) PnIj, Inject into the skin., Disp: , Rfl:     promethazine (PHENERGAN) 25 MG tablet, Take 25 mg by mouth 2 (two) times daily as needed., Disp: , Rfl:     tamoxifen (NOLVADEX) 20 MG Tab, TAKE ONE TABLET BY MOUTH EVERY DAY, Disp: 90 tablet, Rfl: 0    temazepam (RESTORIL) 7.5 MG Cap, Take 7.5 mg by mouth nightly., Disp: , Rfl:     tiZANidine (ZANAFLEX) 4 MG tablet, Take 1 tablet (4 mg total) by mouth every 8 (eight) hours as needed., Disp: 180 tablet, Rfl: 2    traMADol (ULTRAM) 50 mg tablet, Take 1 tablet (50 mg total) by mouth every 6 (six) hours as needed for Pain., Disp: 100 tablet, Rfl: 2    tretinoin (RETIN-A) 0.025 % cream, Apply 1 application topically nightly., Disp: , Rfl:     valACYclovir (VALTREX) 500 MG tablet, Take 500 mg by mouth once daily., Disp: , Rfl:      valsartan (DIOVAN) 40 MG tablet, Take 40 mg by mouth once daily., Disp: , Rfl:     VITAMIN D2 1,250 mcg (50,000 unit) capsule, TAKE ONE CAPSULE BY MOUTH EVERY 30 DAYS, Disp: 6 capsule, Rfl: 1    metroNIDAZOLE (METROGEL) 0.75 % gel, Apply 1 application topically nightly., Disp: , Rfl:      Review of Systems:   Review of Systems   Constitutional:  Negative for appetite change and unexpected weight change.   HENT:  Negative for mouth sores.    Eyes:  Negative for visual disturbance.   Respiratory:  Negative for cough and shortness of breath.    Cardiovascular:  Negative for chest pain.   Gastrointestinal:  Negative for abdominal pain and diarrhea.   Genitourinary:  Negative for frequency.   Musculoskeletal:  Negative for back pain.   Skin:  Negative for rash.   Neurological:  Positive for headaches.   Hematological:  Negative for adenopathy.   Psychiatric/Behavioral:  The patient is not nervous/anxious.     Pertinent positives and negatives included in the HPI. Otherwise a complete review of systems is negative.      Objective:     Vitals:    09/25/23 1103   BP: 97/63   Pulse: 81   Temp: 98.3 °F (36.8 °C)         Physical Exam  Vitals reviewed.   Constitutional:       General: She is awake.      Appearance: Normal appearance.   HENT:      Head: Normocephalic and atraumatic.      Mouth/Throat:      Mouth: Mucous membranes are moist.      Pharynx: Oropharynx is clear.   Eyes:      Extraocular Movements: Extraocular movements intact.      Conjunctiva/sclera: Conjunctivae normal.      Pupils: Pupils are equal, round, and reactive to light.   Cardiovascular:      Rate and Rhythm: Normal rate and regular rhythm.      Pulses: Normal pulses.      Heart sounds: Normal heart sounds.   Pulmonary:      Effort: Pulmonary effort is normal.      Breath sounds: Normal breath sounds and air entry.   Chest:   Breasts:     Right: Normal.      Left: Normal.   Abdominal:      General: Abdomen is flat. Bowel sounds are normal.       Palpations: Abdomen is soft.      Tenderness: There is no abdominal tenderness.   Musculoskeletal:      Cervical back: Normal range of motion.      Right lower leg: No edema.      Left lower leg: No edema.   Lymphadenopathy:      Cervical: No cervical adenopathy.      Upper Body:      Right upper body: No axillary adenopathy.      Left upper body: No axillary adenopathy.      Lower Body: No right inguinal adenopathy. No left inguinal adenopathy.   Skin:     General: Skin is warm and dry.   Neurological:      General: No focal deficit present.      Mental Status: She is alert and oriented to person, place, and time. Mental status is at baseline.   Psychiatric:         Attention and Perception: Attention normal.         Mood and Affect: Mood and affect normal.         Behavior: Behavior normal. Behavior is cooperative.         Thought Content: Thought content normal.         Judgment: Judgment normal.         Lab Review:  Results for orders placed or performed in visit on 03/13/23   Comprehensive Metabolic Panel   Result Value Ref Range    Sodium Level 143 136 - 145 mmol/L    Potassium Level 4.7 3.5 - 5.1 mmol/L    Chloride 108 (H) 98 - 107 mmol/L    Carbon Dioxide 28 22 - 29 mmol/L    Glucose Level 116 (H) 74 - 100 mg/dL    Blood Urea Nitrogen 8.4 (L) 9.8 - 20.1 mg/dL    Creatinine 0.65 0.55 - 1.02 mg/dL    Calcium Level Total 9.2 8.4 - 10.2 mg/dL    Protein Total 6.2 (L) 6.4 - 8.3 gm/dL    Albumin Level 3.7 3.5 - 5.0 g/dL    Globulin 2.5 2.4 - 3.5 gm/dL    Albumin/Globulin Ratio 1.5 1.1 - 2.0 ratio    Bilirubin Total 0.2 <=1.5 mg/dL    Alkaline Phosphatase 39 (L) 40 - 150 unit/L    Alanine Aminotransferase 13 0 - 55 unit/L    Aspartate Aminotransferase 16 5 - 34 unit/L    eGFR >60 mls/min/1.73/m2   Vitamin D   Result Value Ref Range    Vit D 25 OH 54.2 30.0 - 80.0 ng/mL   CBC with Differential   Result Value Ref Range    WBC 6.4 4.5 - 11.5 x10(3)/mcL    RBC 4.04 (L) 4.20 - 5.40 x10(6)/mcL    Hgb 12.6 12.0 - 16.0  g/dL    Hct 39.4 37.0 - 47.0 %    MCV 97.5 (H) 80.0 - 94.0 fL    MCH 31.2 pg    MCHC 32.0 (L) 33.0 - 36.0 g/dL    RDW 13.8 11.5 - 17.0 %    Platelet 207 130 - 400 x10(3)/mcL    MPV 8.8 7.4 - 10.4 fL    Neut % 56.8 %    Lymph % 31.2 %    Mono % 8.0 %    Eos % 3.3 %    Basophil % 0.5 %    Lymph # 1.99 0.6 - 4.6 x10(3)/mcL    Neut # 3.63 2.1 - 9.2 x10(3)/mcL    Mono # 0.51 0.1 - 1.3 x10(3)/mcL    Eos # 0.21 0 - 0.9 x10(3)/mcL    Baso # 0.03 0 - 0.2 x10(3)/mcL    IG# 0.01 0 - 0.04 x10(3)/mcL    IG% 0.2 %        Impression  52-year-old postmenopausal female Oncotype of 10 with a history of breast carcinoma  Current use of tamoxifen   Ozempic use   Risk of malabsorption  History of vitamin-D deficiency      Options of therapy discussed,: Changed to Arimidex, cotinue tamoxifen  This a 50-year-old postmenopausal female now with an Oncotype of 10. She was treated years ago with Taxotere Cytoxan and then tamoxifen x5 years. She was recently changed aromatase inhibitor in March 2021. (tried all three, letrozole and Arimidex  and Aromasin.. discussed extended tamoxifen at [facility] in  8 -9 months   Continue vitamin-D with risk of malabsorption   On follow-up recheck CBC, CMP, vitamin-D level  can consider re-trial of AI in future  otherwise continue Tamoxifen till March of 2026    f/u with primary for +GELY and neuropathy,       Follow up in about 9 months (around 6/25/2024) for DR. CHENG ONLY - Monday appts.    Nir Cheng MD

## 2023-09-25 ENCOUNTER — OFFICE VISIT (OUTPATIENT)
Dept: HEMATOLOGY/ONCOLOGY | Facility: CLINIC | Age: 53
End: 2023-09-25
Payer: COMMERCIAL

## 2023-09-25 VITALS
HEART RATE: 81 BPM | OXYGEN SATURATION: 100 % | TEMPERATURE: 98 F | HEIGHT: 65 IN | DIASTOLIC BLOOD PRESSURE: 63 MMHG | WEIGHT: 141.13 LBS | BODY MASS INDEX: 23.52 KG/M2 | SYSTOLIC BLOOD PRESSURE: 97 MMHG

## 2023-09-25 DIAGNOSIS — E55.9 VITAMIN D DEFICIENCY: ICD-10-CM

## 2023-09-25 DIAGNOSIS — Z17.0 MALIGNANT NEOPLASM OF CENTRAL PORTION OF RIGHT BREAST IN FEMALE, ESTROGEN RECEPTOR POSITIVE: Primary | ICD-10-CM

## 2023-09-25 DIAGNOSIS — C50.919 MALIGNANT NEOPLASM OF FEMALE BREAST, UNSPECIFIED ESTROGEN RECEPTOR STATUS, UNSPECIFIED LATERALITY, UNSPECIFIED SITE OF BREAST: Chronic | ICD-10-CM

## 2023-09-25 DIAGNOSIS — C50.111 MALIGNANT NEOPLASM OF CENTRAL PORTION OF RIGHT BREAST IN FEMALE, ESTROGEN RECEPTOR POSITIVE: Primary | ICD-10-CM

## 2023-09-25 PROCEDURE — 3078F PR MOST RECENT DIASTOLIC BLOOD PRESSURE < 80 MM HG: ICD-10-PCS | Mod: CPTII,S$GLB,, | Performed by: INTERNAL MEDICINE

## 2023-09-25 PROCEDURE — 1159F MED LIST DOCD IN RCRD: CPT | Mod: CPTII,S$GLB,, | Performed by: INTERNAL MEDICINE

## 2023-09-25 PROCEDURE — 4010F ACE/ARB THERAPY RXD/TAKEN: CPT | Mod: CPTII,S$GLB,, | Performed by: INTERNAL MEDICINE

## 2023-09-25 PROCEDURE — 3074F SYST BP LT 130 MM HG: CPT | Mod: CPTII,S$GLB,, | Performed by: INTERNAL MEDICINE

## 2023-09-25 PROCEDURE — 1159F PR MEDICATION LIST DOCUMENTED IN MEDICAL RECORD: ICD-10-PCS | Mod: CPTII,S$GLB,, | Performed by: INTERNAL MEDICINE

## 2023-09-25 PROCEDURE — 99999 PR PBB SHADOW E&M-EST. PATIENT-LVL V: ICD-10-PCS | Mod: PBBFAC,,, | Performed by: INTERNAL MEDICINE

## 2023-09-25 PROCEDURE — 99214 PR OFFICE/OUTPT VISIT, EST, LEVL IV, 30-39 MIN: ICD-10-PCS | Mod: S$GLB,,, | Performed by: INTERNAL MEDICINE

## 2023-09-25 PROCEDURE — 99999 PR PBB SHADOW E&M-EST. PATIENT-LVL V: CPT | Mod: PBBFAC,,, | Performed by: INTERNAL MEDICINE

## 2023-09-25 PROCEDURE — 4010F PR ACE/ARB THEARPY RXD/TAKEN: ICD-10-PCS | Mod: CPTII,S$GLB,, | Performed by: INTERNAL MEDICINE

## 2023-09-25 PROCEDURE — 3008F PR BODY MASS INDEX (BMI) DOCUMENTED: ICD-10-PCS | Mod: CPTII,S$GLB,, | Performed by: INTERNAL MEDICINE

## 2023-09-25 PROCEDURE — 3008F BODY MASS INDEX DOCD: CPT | Mod: CPTII,S$GLB,, | Performed by: INTERNAL MEDICINE

## 2023-09-25 PROCEDURE — 1160F RVW MEDS BY RX/DR IN RCRD: CPT | Mod: CPTII,S$GLB,, | Performed by: INTERNAL MEDICINE

## 2023-09-25 PROCEDURE — 3078F DIAST BP <80 MM HG: CPT | Mod: CPTII,S$GLB,, | Performed by: INTERNAL MEDICINE

## 2023-09-25 PROCEDURE — 1160F PR REVIEW ALL MEDS BY PRESCRIBER/CLIN PHARMACIST DOCUMENTED: ICD-10-PCS | Mod: CPTII,S$GLB,, | Performed by: INTERNAL MEDICINE

## 2023-09-25 PROCEDURE — 3074F PR MOST RECENT SYSTOLIC BLOOD PRESSURE < 130 MM HG: ICD-10-PCS | Mod: CPTII,S$GLB,, | Performed by: INTERNAL MEDICINE

## 2023-09-25 PROCEDURE — 99214 OFFICE O/P EST MOD 30 MIN: CPT | Mod: S$GLB,,, | Performed by: INTERNAL MEDICINE

## 2023-09-25 RX ORDER — SEMAGLUTIDE 2.68 MG/ML
INJECTION, SOLUTION SUBCUTANEOUS
COMMUNITY
Start: 2023-09-16

## 2023-09-25 RX ORDER — ERGOCALCIFEROL 1.25 MG/1
CAPSULE ORAL
Qty: 3 CAPSULE | Refills: 2 | Status: SHIPPED | OUTPATIENT
Start: 2023-09-25

## 2023-09-25 RX ORDER — TAMOXIFEN CITRATE 20 MG/1
20 TABLET ORAL DAILY
Qty: 90 TABLET | Refills: 2 | Status: SHIPPED | OUTPATIENT
Start: 2023-09-25

## 2024-07-11 NOTE — PROGRESS NOTES
Heme/Onc Progress Note                                                                       PATIENT: Linda Wall  MRN: [id number]  DATE: 7/15/2024  Chief Complaint: Follow-up (Patient is here for her 9 month visit)      Current Treatment:  Tamoxifen     Oncology History   Malignant neoplasm of central portion of right breast in female, estrogen receptor positive   12/9/2015 Initial Diagnosis    Breast cancer     12/9/2015 Cancer Staged    Staging form: Onc Breast AJCC V7  - Pathologic stage from 12/9/2015: Stage IIA (T2, N0(i+), cM0)     12/9/2015 Surgery    Bilateral Mastectomies       Genetic Testing    Genetic screening BRCA negative     12/9/2015 Initial Diagnosis    Malignant neoplasm of central portion of right female breast, T2N0(i+) M0, diagnosed on 12/9/2015.  Surgical pathology: revealed a 2.5 cm right breast invasive lobular carcinoma, surgical marking margins negative all 3 lymph nodes negative for metastatic disease however positive by   immunohistochemical staining,  ER 96% AL 81%, HER-2 new negative not amplified by FISH, Ki-67 4%  Oncotype score of 10         2016 - 3/25/2016 Chemotherapy    Taxotere/Cytoxan every 3 weeks x4, completed March 25, 2016.  reconstruction.       3/2016 - 3/2021 Hormone Therapy    Tamoxifen     3/2021 -  Hormone Therapy    Aromatase inhibitor trial, letrozole, Arimidex, Aromasin (patient intolerant to all 3)      Hormone Therapy    Current treatment: Tamoxifen         07/15/2024  Patient presents for 6 month follow up with her ; compliant taking Tamoxifen.    She reports to feeling good; continues to complete daily activity and work in the yard.  Denies abnormal bleeding, lumps, s/s of infection and SOB.  Advised hot flashes at night but tolerable.  Patient advised she is following provider for difficulty swallowing.  Recently prescribed Protonix and special diet due to esophagitis.  Reviewed CBC results.          Past Medical History:   Diagnosis  Date    Abnormal Pap smear of cervix     Acid reflux     Breast cancer     Diabetes mellitus     Herpes simplex virus (HSV) infection     Hyperlipidemia     Hypertension     Mental disorder     Osteoarthritis         Current Outpatient Medications:     aspirin (ECOTRIN) 81 MG EC tablet, Take 81 mg by mouth once daily., Disp: , Rfl:     celecoxib (CELEBREX) 200 MG capsule, Take 200 mg by mouth., Disp: , Rfl:     cloNIDine (CATAPRES) 0.2 MG tablet, Take 1 tablet (0.2 mg total) by mouth 2 (two) times daily as needed., Disp: 180 tablet, Rfl: 3    ergocalciferol (VITAMIN D2) 50,000 unit Cap, TAKE ONE CAPSULE BY MOUTH EVERY 30 DAYS, Disp: 3 capsule, Rfl: 2    estradioL (VAGIFEM) 10 mcg Tab, INSERT ONE TABLET VAGINALLY AT BEDTIME THREE X'S A WEEK, Disp: 16 tablet, Rfl: 5    fluticasone propionate (FLONASE ALLERGY RELIEF) 50 mcg/actuation nasal spray, 1 spray (50 mcg total) by Each Nare route once daily., Disp: 16 g, Rfl: 5    ipratropium (ATROVENT) 21 mcg (0.03 %) nasal spray, SMARTSI Both Nares Every Night, Disp: , Rfl:     metFORMIN (GLUCOPHAGE) 500 MG tablet, Take 500 mg by mouth., Disp: , Rfl:     metoprolol succinate (TOPROL-XL) 25 MG 24 hr tablet, Take 12.5 mg by mouth once daily., Disp: , Rfl:     minocycline (MINOCIN,DYNACIN) 100 MG capsule, Take by mouth., Disp: , Rfl:     OZEMPIC 2 mg/dose (8 mg/3 mL) PnIj, Inject into the skin., Disp: , Rfl:     pantoprazole (PROTONIX) 40 MG tablet, Take 40 mg by mouth., Disp: , Rfl:     promethazine (PHENERGAN) 25 MG tablet, Take 25 mg by mouth 2 (two) times daily as needed., Disp: , Rfl:     tamoxifen (NOLVADEX) 20 MG Tab, Take 1 tablet (20 mg total) by mouth once daily., Disp: 90 tablet, Rfl: 2    temazepam (RESTORIL) 7.5 MG Cap, Take 7.5 mg by mouth nightly., Disp: , Rfl:     tiZANidine (ZANAFLEX) 4 MG tablet, Take 1 tablet (4 mg total) by mouth every 8 (eight) hours as needed., Disp: 180 tablet, Rfl: 2    traMADol (ULTRAM) 50 mg tablet, Take 1 tablet (50 mg total) by  mouth every 6 (six) hours as needed for Pain., Disp: 100 tablet, Rfl: 2    tretinoin (RETIN-A) 0.025 % cream, Apply 1 application topically nightly., Disp: , Rfl:     valACYclovir (VALTREX) 500 MG tablet, Take 500 mg by mouth once daily., Disp: , Rfl:     valsartan (DIOVAN) 40 MG tablet, Take 40 mg by mouth once daily., Disp: , Rfl:     bepotastine besilate (BEPREVE) 1.5 % Drop, Apply to eye., Disp: , Rfl:     ciclopirox (PENLAC) 8 % Soln, Apply topically., Disp: , Rfl:     metroNIDAZOLE (METROGEL) 0.75 % gel, Apply 1 application topically nightly., Disp: , Rfl:      Review of Systems:   Review of Systems   Constitutional:  Negative for appetite change and unexpected weight change.   HENT:  Negative for mouth sores.    Eyes:  Negative for visual disturbance.   Respiratory:  Negative for cough and shortness of breath.    Cardiovascular:  Negative for chest pain.   Gastrointestinal:  Negative for abdominal pain and diarrhea.   Genitourinary:  Negative for frequency.   Musculoskeletal:  Negative for back pain.   Skin:  Negative for rash.   Neurological:  Positive for headaches.   Hematological:  Negative for adenopathy.   Psychiatric/Behavioral:  The patient is not nervous/anxious.       Pertinent positives and negatives included in the HPI. Otherwise a complete review of systems is negative.      Objective:     Vitals:    07/15/24 1001   BP: 122/77   Pulse: 71   Temp: 98.1 °F (36.7 °C)           Physical Exam  Vitals reviewed.   Constitutional:       General: She is awake.      Appearance: Normal appearance.   HENT:      Head: Normocephalic and atraumatic.      Mouth/Throat:      Mouth: Mucous membranes are moist.      Pharynx: Oropharynx is clear.   Eyes:      Extraocular Movements: Extraocular movements intact.      Conjunctiva/sclera: Conjunctivae normal.      Pupils: Pupils are equal, round, and reactive to light.   Cardiovascular:      Rate and Rhythm: Normal rate and regular rhythm.      Pulses: Normal pulses.       Heart sounds: Normal heart sounds.   Pulmonary:      Effort: Pulmonary effort is normal.      Breath sounds: Normal breath sounds and air entry.   Chest:   Breasts:     Right: Normal.      Left: Normal.   Abdominal:      General: Abdomen is flat. Bowel sounds are normal.      Palpations: Abdomen is soft.      Tenderness: There is no abdominal tenderness.   Musculoskeletal:      Cervical back: Normal range of motion.      Right lower leg: No edema.      Left lower leg: No edema.   Lymphadenopathy:      Cervical: No cervical adenopathy.      Upper Body:      Right upper body: No axillary adenopathy.      Left upper body: No axillary adenopathy.      Lower Body: No right inguinal adenopathy. No left inguinal adenopathy.   Skin:     General: Skin is warm and dry.   Neurological:      General: No focal deficit present.      Mental Status: She is alert and oriented to person, place, and time. Mental status is at baseline.   Psychiatric:         Attention and Perception: Attention normal.         Mood and Affect: Mood and affect normal.         Behavior: Behavior normal. Behavior is cooperative.         Thought Content: Thought content normal.         Judgment: Judgment normal.           Lab Review:  Results for orders placed or performed in visit on 07/15/24   CBC with Differential   Result Value Ref Range    WBC 6.15 4.50 - 11.50 x10(3)/mcL    RBC 3.92 (L) 4.20 - 5.40 x10(6)/mcL    Hgb 12.4 12.0 - 16.0 g/dL    Hct 37.3 37.0 - 47.0 %    MCV 95.2 (H) 80.0 - 94.0 fL    MCH 31.6 (H) 27.0 - 31.0 pg    MCHC 33.2 33.0 - 36.0 g/dL    RDW 13.6 11.5 - 17.0 %    Platelet 199 130 - 400 x10(3)/mcL    MPV 8.8 7.4 - 10.4 fL    Neut % 58.1 %    Lymph % 30.6 %    Mono % 8.3 %    Eos % 2.3 %    Basophil % 0.5 %    Lymph # 1.88 0.6 - 4.6 x10(3)/mcL    Neut # 3.58 2.1 - 9.2 x10(3)/mcL    Mono # 0.51 0.1 - 1.3 x10(3)/mcL    Eos # 0.14 0 - 0.9 x10(3)/mcL    Baso # 0.03 <=0.2 x10(3)/mcL    IG# 0.01 0 - 0.04 x10(3)/mcL    IG% 0.2 %         Impression  53-year-old postmenopausal female Oncotype of 10 with a history of breast carcinoma  Current use of tamoxifen ---refill      Ozempic use   Risk of malabsorption    History of vitamin-D deficiency    Options of therapy discussed,: Changed to Arimidex, cotinue tamoxifen  This a 53-year-old postmenopausal female now with an Oncotype of 10. She was treated years ago with Taxotere Cytoxan and then tamoxifen x5 years. She was  changed aromatase inhibitor in March 2021. (tried all three, letrozole and Arimidex  and Aromasin.and intolerant. discussed extended tamoxifen at 51  Continue tamoxifen until March of 2026.  Continue to follow up primary care to be up-to-date on age-appropriate cancer screening        I, Nimco Enriquez LPN, acted solely as a scribe for and in the presence of, Dr. Nir Cheng who performed the service.      Orders Placed This Encounter    Comprehensive Metabolic Panel    CBC Auto Differential          Follow up in about 9 months (around 4/15/2025) for Labs cbc/cmp, OV with [facility].    Nir Cheng MD

## 2024-07-15 ENCOUNTER — OFFICE VISIT (OUTPATIENT)
Dept: HEMATOLOGY/ONCOLOGY | Facility: CLINIC | Age: 54
End: 2024-07-15
Payer: COMMERCIAL

## 2024-07-15 ENCOUNTER — LAB VISIT (OUTPATIENT)
Dept: LAB | Facility: HOSPITAL | Age: 54
End: 2024-07-15
Attending: INTERNAL MEDICINE
Payer: COMMERCIAL

## 2024-07-15 VITALS
HEART RATE: 71 BPM | TEMPERATURE: 98 F | BODY MASS INDEX: 25.34 KG/M2 | OXYGEN SATURATION: 100 % | SYSTOLIC BLOOD PRESSURE: 122 MMHG | HEIGHT: 65 IN | WEIGHT: 152.13 LBS | DIASTOLIC BLOOD PRESSURE: 77 MMHG

## 2024-07-15 DIAGNOSIS — Z17.0 MALIGNANT NEOPLASM OF CENTRAL PORTION OF RIGHT BREAST IN FEMALE, ESTROGEN RECEPTOR POSITIVE: Primary | ICD-10-CM

## 2024-07-15 DIAGNOSIS — E55.9 VITAMIN D DEFICIENCY: ICD-10-CM

## 2024-07-15 DIAGNOSIS — C50.919 MALIGNANT NEOPLASM OF FEMALE BREAST, UNSPECIFIED ESTROGEN RECEPTOR STATUS, UNSPECIFIED LATERALITY, UNSPECIFIED SITE OF BREAST: Chronic | ICD-10-CM

## 2024-07-15 DIAGNOSIS — Z79.810 LONG-TERM CURRENT USE OF TAMOXIFEN: ICD-10-CM

## 2024-07-15 DIAGNOSIS — C50.111 MALIGNANT NEOPLASM OF CENTRAL PORTION OF RIGHT BREAST IN FEMALE, ESTROGEN RECEPTOR POSITIVE: Primary | ICD-10-CM

## 2024-07-15 DIAGNOSIS — C50.919 MALIGNANT NEOPLASM OF FEMALE BREAST, UNSPECIFIED ESTROGEN RECEPTOR STATUS, UNSPECIFIED LATERALITY, UNSPECIFIED SITE OF BREAST: ICD-10-CM

## 2024-07-15 LAB
25(OH)D3+25(OH)D2 SERPL-MCNC: 32 NG/ML (ref 30–80)
ALBUMIN SERPL-MCNC: 3.8 G/DL (ref 3.5–5)
ALBUMIN/GLOB SERPL: 1.5 RATIO (ref 1.1–2)
ALP SERPL-CCNC: 39 UNIT/L (ref 40–150)
ALT SERPL-CCNC: 9 UNIT/L (ref 0–55)
ANION GAP SERPL CALC-SCNC: 7 MEQ/L
AST SERPL-CCNC: 15 UNIT/L (ref 5–34)
BASOPHILS # BLD AUTO: 0.03 X10(3)/MCL
BASOPHILS NFR BLD AUTO: 0.5 %
BILIRUB SERPL-MCNC: 0.2 MG/DL
BUN SERPL-MCNC: 12 MG/DL (ref 9.8–20.1)
CALCIUM SERPL-MCNC: 9.1 MG/DL (ref 8.4–10.2)
CHLORIDE SERPL-SCNC: 108 MMOL/L (ref 98–107)
CO2 SERPL-SCNC: 27 MMOL/L (ref 22–29)
CREAT SERPL-MCNC: 0.71 MG/DL (ref 0.55–1.02)
CREAT/UREA NIT SERPL: 17
EOSINOPHIL # BLD AUTO: 0.14 X10(3)/MCL (ref 0–0.9)
EOSINOPHIL NFR BLD AUTO: 2.3 %
ERYTHROCYTE [DISTWIDTH] IN BLOOD BY AUTOMATED COUNT: 13.6 % (ref 11.5–17)
GFR SERPLBLD CREATININE-BSD FMLA CKD-EPI: >60 ML/MIN/1.73/M2
GLOBULIN SER-MCNC: 2.5 GM/DL (ref 2.4–3.5)
GLUCOSE SERPL-MCNC: 85 MG/DL (ref 74–100)
HCT VFR BLD AUTO: 37.3 % (ref 37–47)
HGB BLD-MCNC: 12.4 G/DL (ref 12–16)
IMM GRANULOCYTES # BLD AUTO: 0.01 X10(3)/MCL (ref 0–0.04)
IMM GRANULOCYTES NFR BLD AUTO: 0.2 %
LYMPHOCYTES # BLD AUTO: 1.88 X10(3)/MCL (ref 0.6–4.6)
LYMPHOCYTES NFR BLD AUTO: 30.6 %
MCH RBC QN AUTO: 31.6 PG (ref 27–31)
MCHC RBC AUTO-ENTMCNC: 33.2 G/DL (ref 33–36)
MCV RBC AUTO: 95.2 FL (ref 80–94)
MONOCYTES # BLD AUTO: 0.51 X10(3)/MCL (ref 0.1–1.3)
MONOCYTES NFR BLD AUTO: 8.3 %
NEUTROPHILS # BLD AUTO: 3.58 X10(3)/MCL (ref 2.1–9.2)
NEUTROPHILS NFR BLD AUTO: 58.1 %
PLATELET # BLD AUTO: 199 X10(3)/MCL (ref 130–400)
PMV BLD AUTO: 8.8 FL (ref 7.4–10.4)
POTASSIUM SERPL-SCNC: 4.1 MMOL/L (ref 3.5–5.1)
PROT SERPL-MCNC: 6.3 GM/DL (ref 6.4–8.3)
RBC # BLD AUTO: 3.92 X10(6)/MCL (ref 4.2–5.4)
SODIUM SERPL-SCNC: 142 MMOL/L (ref 136–145)
WBC # BLD AUTO: 6.15 X10(3)/MCL (ref 4.5–11.5)

## 2024-07-15 PROCEDURE — 36415 COLL VENOUS BLD VENIPUNCTURE: CPT

## 2024-07-15 PROCEDURE — 82306 VITAMIN D 25 HYDROXY: CPT

## 2024-07-15 PROCEDURE — 1160F RVW MEDS BY RX/DR IN RCRD: CPT | Mod: CPTII,S$GLB,, | Performed by: INTERNAL MEDICINE

## 2024-07-15 PROCEDURE — 3074F SYST BP LT 130 MM HG: CPT | Mod: CPTII,S$GLB,, | Performed by: INTERNAL MEDICINE

## 2024-07-15 PROCEDURE — 99214 OFFICE O/P EST MOD 30 MIN: CPT | Mod: S$GLB,,, | Performed by: INTERNAL MEDICINE

## 2024-07-15 PROCEDURE — 4010F ACE/ARB THERAPY RXD/TAKEN: CPT | Mod: CPTII,S$GLB,, | Performed by: INTERNAL MEDICINE

## 2024-07-15 PROCEDURE — 1159F MED LIST DOCD IN RCRD: CPT | Mod: CPTII,S$GLB,, | Performed by: INTERNAL MEDICINE

## 2024-07-15 PROCEDURE — 80053 COMPREHEN METABOLIC PANEL: CPT

## 2024-07-15 PROCEDURE — 99999 PR PBB SHADOW E&M-EST. PATIENT-LVL V: CPT | Mod: PBBFAC,,, | Performed by: INTERNAL MEDICINE

## 2024-07-15 PROCEDURE — 3008F BODY MASS INDEX DOCD: CPT | Mod: CPTII,S$GLB,, | Performed by: INTERNAL MEDICINE

## 2024-07-15 PROCEDURE — 85025 COMPLETE CBC W/AUTO DIFF WBC: CPT

## 2024-07-15 PROCEDURE — 3078F DIAST BP <80 MM HG: CPT | Mod: CPTII,S$GLB,, | Performed by: INTERNAL MEDICINE

## 2024-07-15 RX ORDER — ERGOCALCIFEROL 1.25 MG/1
CAPSULE ORAL
Qty: 3 CAPSULE | Refills: 2 | Status: SHIPPED | OUTPATIENT
Start: 2024-07-15

## 2024-07-15 RX ORDER — PANTOPRAZOLE SODIUM 40 MG/1
40 TABLET, DELAYED RELEASE ORAL
COMMUNITY
Start: 2024-07-02

## 2024-07-15 RX ORDER — TAMOXIFEN CITRATE 20 MG/1
20 TABLET ORAL DAILY
Qty: 90 TABLET | Refills: 2 | Status: CANCELLED | OUTPATIENT
Start: 2024-07-15

## 2024-07-15 RX ORDER — TAMOXIFEN CITRATE 20 MG/1
20 TABLET ORAL DAILY
Qty: 90 TABLET | Refills: 2 | Status: SHIPPED | OUTPATIENT
Start: 2024-07-15

## 2025-06-13 NOTE — PROGRESS NOTES
Heme/Onc Progress Note                                                                       PATIENT: Linda Wall  MRN: [id number]  DATE: 6/16/2025  Chief Complaint: 9 month f/u      Current Treatment:  Tamoxifen     Oncology History   Malignant neoplasm of central portion of right breast in female, estrogen receptor positive   12/9/2015 Initial Diagnosis    Breast cancer     12/9/2015 Cancer Staged    Staging form: Onc Breast AJCC V7  - Pathologic stage from 12/9/2015: Stage IIA (T2, N0(i+), cM0)     12/9/2015 Surgery    Bilateral Mastectomies       Genetic Testing    Genetic screening BRCA negative     12/9/2015 Initial Diagnosis    Malignant neoplasm of central portion of right female breast, T2N0(i+) M0, diagnosed on 12/9/2015.  Surgical pathology: revealed a 2.5 cm right breast invasive lobular carcinoma, surgical marking margins negative all 3 lymph nodes negative for metastatic disease however positive by   immunohistochemical staining,  ER 96% NH 81%, HER-2 new negative not amplified by FISH, Ki-67 4%  Oncotype score of 10         2016 - 3/25/2016 Chemotherapy    Taxotere/Cytoxan every 3 weeks x4, completed March 25, 2016.  reconstruction.       3/2016 - 3/2021 Hormone Therapy    Tamoxifen     3/2021 -  Hormone Therapy    Aromatase inhibitor trial, letrozole, Arimidex, Aromasin (patient intolerant to all 3)      Hormone Therapy    Current treatment: Tamoxifen         06/16/2025  Patient presents for follow up with her ; compliant taking Tamoxifen.    She reports to feeling good; clean and nourished in appearance.  She continues to complete exercise and work in the yard.    Denies abnormal bleeding, lumps, s/s of infection and SOB.  Reviewed CBC results, encouraged repeat DXA and refilled tamoxifen.  Stop tamoxifen March 2026.      Past Medical History:   Diagnosis Date    Abnormal Pap smear of cervix     Acid reflux     Breast cancer     Diabetes mellitus     Herpes simplex virus  (HSV) infection     Hyperlipidemia     Hypertension     Mental disorder     Osteoarthritis         Current Outpatient Medications:     aspirin (ECOTRIN) 81 MG EC tablet, Take 81 mg by mouth once daily., Disp: , Rfl:     bepotastine besilate (BEPREVE) 1.5 % Drop, Apply to eye., Disp: , Rfl:     celecoxib (CELEBREX) 200 MG capsule, Take 200 mg by mouth., Disp: , Rfl:     cloNIDine (CATAPRES) 0.2 MG tablet, Take 1 tablet (0.2 mg total) by mouth 2 (two) times daily as needed., Disp: 180 tablet, Rfl: 3    ergocalciferol (VITAMIN D2) 50,000 unit Cap, TAKE ONE CAPSULE BY MOUTH EVERY 30 DAYS, Disp: 3 capsule, Rfl: 2    estradioL (VAGIFEM) 10 mcg Tab, INSERT ONE TABLET VAGINALLY AT BEDTIME four  X'S A WEEK, Disp: 16 tablet, Rfl: 11    fluticasone propionate (FLONASE ALLERGY RELIEF) 50 mcg/actuation nasal spray, 1 spray (50 mcg total) by Each Nare route once daily., Disp: 16 g, Rfl: 5    ipratropium (ATROVENT) 21 mcg (0.03 %) nasal spray, SMARTSI Both Nares Every Night, Disp: , Rfl:     metFORMIN (GLUCOPHAGE) 500 MG tablet, Take 500 mg by mouth., Disp: , Rfl:     metoprolol succinate (TOPROL-XL) 25 MG 24 hr tablet, Take 12.5 mg by mouth once daily. (Patient taking differently: Take 50 mg by mouth once daily.), Disp: , Rfl:     metroNIDAZOLE (METROGEL) 0.75 % gel, Apply 1 application topically nightly., Disp: , Rfl:     OZEMPIC 2 mg/dose (8 mg/3 mL) PnIj, Inject into the skin., Disp: , Rfl:     pantoprazole (PROTONIX) 40 MG tablet, Take 40 mg by mouth., Disp: , Rfl:     promethazine (PHENERGAN) 25 MG tablet, Take 25 mg by mouth 2 (two) times daily as needed., Disp: , Rfl:     temazepam (RESTORIL) 7.5 MG Cap, Take 7.5 mg by mouth nightly., Disp: , Rfl:     tiZANidine (ZANAFLEX) 4 MG tablet, Take 1 tablet (4 mg total) by mouth every 8 (eight) hours as needed., Disp: 180 tablet, Rfl: 2    traMADol (ULTRAM) 50 mg tablet, Take 1 tablet (50 mg total) by mouth every 6 (six) hours as needed for Pain., Disp: 100 tablet, Rfl: 2     tretinoin (RETIN-A) 0.025 % cream, Apply 1 application topically nightly., Disp: , Rfl:     valACYclovir (VALTREX) 500 MG tablet, Take 500 mg by mouth once daily., Disp: , Rfl:     valsartan (DIOVAN) 160 MG tablet, Take 160 mg by mouth every morning., Disp: , Rfl:     ciclopirox (PENLAC) 8 % Soln, Apply topically. (Patient not taking: Reported on 6/16/2025), Disp: , Rfl:     minocycline (MINOCIN,DYNACIN) 100 MG capsule, Take by mouth. (Patient not taking: Reported on 6/16/2025), Disp: , Rfl:     tamoxifen (NOLVADEX) 20 MG Tab, Take 1 tablet (20 mg total) by mouth once daily., Disp: 90 tablet, Rfl: 2    valsartan (DIOVAN) 40 MG tablet, Take 40 mg by mouth once daily. (Patient not taking: Reported on 6/16/2025), Disp: , Rfl:      Review of Systems:   Review of Systems   Constitutional:  Negative for appetite change and unexpected weight change.   HENT:  Negative for mouth sores.    Eyes:  Negative for visual disturbance.   Respiratory:  Negative for cough and shortness of breath.    Cardiovascular:  Negative for chest pain.   Gastrointestinal:  Negative for abdominal pain and diarrhea.   Genitourinary:  Negative for frequency.   Musculoskeletal:  Negative for back pain.   Skin:  Negative for rash.   Neurological:  Positive for headaches.   Hematological:  Negative for adenopathy.   Psychiatric/Behavioral:  The patient is not nervous/anxious.       Pertinent positives and negatives included in the HPI. Otherwise a complete review of systems is negative.      Objective:     Vitals:    06/16/25 1001   BP: 107/74   Pulse: 73   Resp: 18   Temp: 98.2 °F (36.8 °C)             Physical Exam  Vitals reviewed.   Constitutional:       General: She is awake.      Appearance: Normal appearance.   HENT:      Head: Normocephalic and atraumatic.      Mouth/Throat:      Mouth: Mucous membranes are moist.      Pharynx: Oropharynx is clear.   Eyes:      Extraocular Movements: Extraocular movements intact.      Conjunctiva/sclera:  Conjunctivae normal.      Pupils: Pupils are equal, round, and reactive to light.   Cardiovascular:      Rate and Rhythm: Normal rate and regular rhythm.      Pulses: Normal pulses.      Heart sounds: Normal heart sounds.   Pulmonary:      Effort: Pulmonary effort is normal.      Breath sounds: Normal breath sounds and air entry.   Chest:   Breasts:     Right: Normal.      Left: Normal.   Abdominal:      General: Abdomen is flat. Bowel sounds are normal.      Palpations: Abdomen is soft.      Tenderness: There is no abdominal tenderness.   Musculoskeletal:      Cervical back: Normal range of motion.      Right lower leg: No edema.      Left lower leg: No edema.   Lymphadenopathy:      Cervical: No cervical adenopathy.      Upper Body:      Right upper body: No axillary adenopathy.      Left upper body: No axillary adenopathy.      Lower Body: No right inguinal adenopathy. No left inguinal adenopathy.   Skin:     General: Skin is warm and dry.   Neurological:      General: No focal deficit present.      Mental Status: She is alert and oriented to person, place, and time. Mental status is at baseline.   Psychiatric:         Attention and Perception: Attention normal.         Mood and Affect: Mood and affect normal.         Behavior: Behavior normal. Behavior is cooperative.         Thought Content: Thought content normal.         Judgment: Judgment normal.           Impression  53-year-old postmenopausal female Oncotype of 10 with a history of breast carcinoma  Current use of tamoxifen ---refill until 3/26  Ozempic use   Risk of malabsorption    History of vitamin-D deficiency  Options of therapy discussed,: Changed to Arimidex, cotinue tamoxifen  This a 54-year-old postmenopausal female now with an Oncotype of 10. She was treated years ago with Taxotere Cytoxan and then tamoxifen x5 years. She was  changed aromatase inhibitor in March 2021. (tried all three, letrozole and Arimidex  and Aromasin.and intolerant.  discussed extended tamoxifen at 51  Continue tamoxifen until March of 2026.  Then will d/c back to PCP and gyn    I, Nimco Enriquez LPN, acted solely as a scribe for and in the presence of, Dr. Nir Cheng who performed the service.           Follow up in about 9 months (around 3/16/2026) for Labs cbc/cmp and OV with [facility].    Nir Cheng MD

## 2025-06-16 ENCOUNTER — LAB VISIT (OUTPATIENT)
Dept: LAB | Facility: HOSPITAL | Age: 55
End: 2025-06-16
Attending: INTERNAL MEDICINE
Payer: COMMERCIAL

## 2025-06-16 ENCOUNTER — OFFICE VISIT (OUTPATIENT)
Dept: HEMATOLOGY/ONCOLOGY | Facility: CLINIC | Age: 55
End: 2025-06-16
Payer: COMMERCIAL

## 2025-06-16 VITALS
RESPIRATION RATE: 18 BRPM | HEART RATE: 73 BPM | BODY MASS INDEX: 23.82 KG/M2 | OXYGEN SATURATION: 99 % | HEIGHT: 65 IN | SYSTOLIC BLOOD PRESSURE: 107 MMHG | WEIGHT: 143 LBS | DIASTOLIC BLOOD PRESSURE: 74 MMHG | TEMPERATURE: 98 F

## 2025-06-16 DIAGNOSIS — Z17.0 MALIGNANT NEOPLASM OF CENTRAL PORTION OF RIGHT BREAST IN FEMALE, ESTROGEN RECEPTOR POSITIVE: ICD-10-CM

## 2025-06-16 DIAGNOSIS — Z79.810 LONG-TERM CURRENT USE OF TAMOXIFEN: ICD-10-CM

## 2025-06-16 DIAGNOSIS — C50.919 MALIGNANT NEOPLASM OF FEMALE BREAST, UNSPECIFIED ESTROGEN RECEPTOR STATUS, UNSPECIFIED LATERALITY, UNSPECIFIED SITE OF BREAST: Primary | ICD-10-CM

## 2025-06-16 DIAGNOSIS — C50.111 MALIGNANT NEOPLASM OF CENTRAL PORTION OF RIGHT BREAST IN FEMALE, ESTROGEN RECEPTOR POSITIVE: ICD-10-CM

## 2025-06-16 DIAGNOSIS — E55.9 VITAMIN D DEFICIENCY: ICD-10-CM

## 2025-06-16 DIAGNOSIS — C50.919 MALIGNANT NEOPLASM OF FEMALE BREAST, UNSPECIFIED ESTROGEN RECEPTOR STATUS, UNSPECIFIED LATERALITY, UNSPECIFIED SITE OF BREAST: Chronic | ICD-10-CM

## 2025-06-16 LAB
ALBUMIN SERPL-MCNC: 4 G/DL (ref 3.5–5)
ALBUMIN/GLOB SERPL: 1.3 RATIO (ref 1.1–2)
ALP SERPL-CCNC: 40 UNIT/L (ref 40–150)
ALT SERPL-CCNC: 13 UNIT/L (ref 0–55)
ANION GAP SERPL CALC-SCNC: 7 MEQ/L
AST SERPL-CCNC: 19 UNIT/L (ref 11–45)
BASOPHILS # BLD AUTO: 0.02 X10(3)/MCL
BASOPHILS NFR BLD AUTO: 0.4 %
BILIRUB SERPL-MCNC: 0.4 MG/DL
BUN SERPL-MCNC: 12 MG/DL (ref 9.8–20.1)
CALCIUM SERPL-MCNC: 9.9 MG/DL (ref 8.4–10.2)
CHLORIDE SERPL-SCNC: 106 MMOL/L (ref 98–107)
CO2 SERPL-SCNC: 27 MMOL/L (ref 22–29)
CREAT SERPL-MCNC: 0.65 MG/DL (ref 0.55–1.02)
CREAT/UREA NIT SERPL: 18
EOSINOPHIL # BLD AUTO: 0.19 X10(3)/MCL (ref 0–0.9)
EOSINOPHIL NFR BLD AUTO: 3.5 %
ERYTHROCYTE [DISTWIDTH] IN BLOOD BY AUTOMATED COUNT: 12.5 % (ref 11.5–17)
GFR SERPLBLD CREATININE-BSD FMLA CKD-EPI: >60 ML/MIN/1.73/M2
GLOBULIN SER-MCNC: 3.2 GM/DL (ref 2.4–3.5)
GLUCOSE SERPL-MCNC: 95 MG/DL (ref 74–100)
HCT VFR BLD AUTO: 38.3 % (ref 37–47)
HGB BLD-MCNC: 13 G/DL (ref 12–16)
IMM GRANULOCYTES # BLD AUTO: 0 X10(3)/MCL (ref 0–0.04)
IMM GRANULOCYTES NFR BLD AUTO: 0 %
LYMPHOCYTES # BLD AUTO: 2.27 X10(3)/MCL (ref 0.6–4.6)
LYMPHOCYTES NFR BLD AUTO: 42.3 %
MCH RBC QN AUTO: 31.7 PG (ref 27–31)
MCHC RBC AUTO-ENTMCNC: 33.9 G/DL (ref 33–36)
MCV RBC AUTO: 93.4 FL (ref 80–94)
MONOCYTES # BLD AUTO: 0.56 X10(3)/MCL (ref 0.1–1.3)
MONOCYTES NFR BLD AUTO: 10.4 %
NEUTROPHILS # BLD AUTO: 2.33 X10(3)/MCL (ref 2.1–9.2)
NEUTROPHILS NFR BLD AUTO: 43.4 %
PLATELET # BLD AUTO: 198 X10(3)/MCL (ref 130–400)
PMV BLD AUTO: 8.9 FL (ref 7.4–10.4)
POTASSIUM SERPL-SCNC: 4.3 MMOL/L (ref 3.5–5.1)
PROT SERPL-MCNC: 7.2 GM/DL (ref 6.4–8.3)
RBC # BLD AUTO: 4.1 X10(6)/MCL (ref 4.2–5.4)
SODIUM SERPL-SCNC: 140 MMOL/L (ref 136–145)
WBC # BLD AUTO: 5.37 X10(3)/MCL (ref 4.5–11.5)

## 2025-06-16 PROCEDURE — 1159F MED LIST DOCD IN RCRD: CPT | Mod: CPTII,S$GLB,, | Performed by: INTERNAL MEDICINE

## 2025-06-16 PROCEDURE — 4010F ACE/ARB THERAPY RXD/TAKEN: CPT | Mod: CPTII,S$GLB,, | Performed by: INTERNAL MEDICINE

## 2025-06-16 PROCEDURE — 3008F BODY MASS INDEX DOCD: CPT | Mod: CPTII,S$GLB,, | Performed by: INTERNAL MEDICINE

## 2025-06-16 PROCEDURE — 85025 COMPLETE CBC W/AUTO DIFF WBC: CPT

## 2025-06-16 PROCEDURE — 3074F SYST BP LT 130 MM HG: CPT | Mod: CPTII,S$GLB,, | Performed by: INTERNAL MEDICINE

## 2025-06-16 PROCEDURE — 80053 COMPREHEN METABOLIC PANEL: CPT

## 2025-06-16 PROCEDURE — 99214 OFFICE O/P EST MOD 30 MIN: CPT | Mod: S$GLB,,, | Performed by: INTERNAL MEDICINE

## 2025-06-16 PROCEDURE — 1160F RVW MEDS BY RX/DR IN RCRD: CPT | Mod: CPTII,S$GLB,, | Performed by: INTERNAL MEDICINE

## 2025-06-16 PROCEDURE — 99999 PR PBB SHADOW E&M-EST. PATIENT-LVL V: CPT | Mod: PBBFAC,,, | Performed by: INTERNAL MEDICINE

## 2025-06-16 PROCEDURE — 3078F DIAST BP <80 MM HG: CPT | Mod: CPTII,S$GLB,, | Performed by: INTERNAL MEDICINE

## 2025-06-16 RX ORDER — VALSARTAN 160 MG/1
160 TABLET ORAL EVERY MORNING
COMMUNITY
Start: 2025-05-27

## 2025-06-16 RX ORDER — TAMOXIFEN CITRATE 20 MG/1
20 TABLET ORAL DAILY
Qty: 90 TABLET | Refills: 2 | Status: SHIPPED | OUTPATIENT
Start: 2025-06-16

## 2025-06-17 DIAGNOSIS — Z85.3 HISTORY OF BREAST CANCER: Primary | ICD-10-CM

## 2025-06-17 DIAGNOSIS — Z79.810 LONG-TERM CURRENT USE OF TAMOXIFEN: ICD-10-CM
